# Patient Record
Sex: FEMALE | Race: WHITE | Employment: FULL TIME | URBAN - METROPOLITAN AREA
[De-identification: names, ages, dates, MRNs, and addresses within clinical notes are randomized per-mention and may not be internally consistent; named-entity substitution may affect disease eponyms.]

---

## 2017-01-23 ENCOUNTER — TELEPHONE (OUTPATIENT)
Dept: FAMILY MEDICINE CLINIC | Facility: CLINIC | Age: 52
End: 2017-01-23

## 2017-02-02 ENCOUNTER — OFFICE VISIT (OUTPATIENT)
Dept: FAMILY MEDICINE CLINIC | Facility: CLINIC | Age: 52
End: 2017-02-02

## 2017-02-02 VITALS
TEMPERATURE: 98 F | RESPIRATION RATE: 16 BRPM | DIASTOLIC BLOOD PRESSURE: 80 MMHG | BODY MASS INDEX: 37 KG/M2 | SYSTOLIC BLOOD PRESSURE: 132 MMHG | HEART RATE: 88 BPM | WEIGHT: 244.63 LBS

## 2017-02-02 DIAGNOSIS — E11.9 TYPE 2 DIABETES MELLITUS WITHOUT COMPLICATION, WITHOUT LONG-TERM CURRENT USE OF INSULIN (HCC): Primary | ICD-10-CM

## 2017-02-02 DIAGNOSIS — J06.9 VIRAL URI: ICD-10-CM

## 2017-02-02 PROCEDURE — 99213 OFFICE O/P EST LOW 20 MIN: CPT | Performed by: INTERNAL MEDICINE

## 2017-02-02 RX ORDER — AZITHROMYCIN 250 MG/1
TABLET, FILM COATED ORAL
Refills: 1 | COMMUNITY
Start: 2017-01-16 | End: 2017-02-02 | Stop reason: ALTCHOICE

## 2017-02-02 RX ORDER — TRAZODONE HYDROCHLORIDE 50 MG/1
TABLET ORAL
Refills: 2 | COMMUNITY
Start: 2017-01-24 | End: 2020-12-22

## 2017-02-02 NOTE — PROGRESS NOTES
[facility] Internal Medicine Office Note  Chief Complaint:   Patient presents with: Follow - Up: pt here for DM / est care in December 2016; told to f/u February. Pt c/o some sinus congestion, sore throat x 2-3 weeks.        HPI:   This is a 5 Status: Never Used                        Alcohol Use: Yes           0.0 oz/week       0 Standard drinks or equivalent per week       Comment: rare    Allergies:    Bactrim                 Hives    Comment:DS TABS  Cephalosporins          Diarrhea, Nausea Cardiovascular: Negative for chest pain. Neurological: Negative. Psychiatric/Behavioral: Negative.          EXAM:   /80 mmHg  Pulse 88  Temp(Src) 98.3 °F (36.8 °C) (Temporal)  Resp 16  Wt 244 lb 9.6 oz Estimated body mass index is 37.2 kg/(m^2) guaifenasin prn    She has weight loss clinic appointment this month.      Orders Placed This Encounter  Hemoglobin A1C [E]    Meds & Refills for this Visit:    No prescriptions requested or ordered in this encounter       Imaging & Consults:  None    Annua

## 2017-02-02 NOTE — PATIENT INSTRUCTIONS
Thank you for choosing Philip Kirkland MD at [place] [address]  To Do: Rashi Aragon  1. Get hemoglobin A1c test after February 18, 2017  2.  Follow up in 4 months     • Please signup for MY CHART, which is electronic access to your record if you have Central Scheduling at [phone]  Please allow our office 5 business days to contact you regarding any testing results.     Refill policies:   Allow 3 business days for refills; controlled substances may take longer and must be picked up from the office i

## 2017-02-08 ENCOUNTER — OFFICE VISIT (OUTPATIENT)
Dept: INTERNAL MEDICINE CLINIC | Facility: CLINIC | Age: 52
End: 2017-02-08

## 2017-02-08 VITALS
WEIGHT: 243.19 LBS | SYSTOLIC BLOOD PRESSURE: 130 MMHG | RESPIRATION RATE: 16 BRPM | HEIGHT: 68 IN | BODY MASS INDEX: 36.86 KG/M2 | DIASTOLIC BLOOD PRESSURE: 78 MMHG | HEART RATE: 80 BPM

## 2017-02-08 DIAGNOSIS — Z87.59 HISTORY OF MISCARRIAGE: ICD-10-CM

## 2017-02-08 DIAGNOSIS — E55.9 VITAMIN D DEFICIENCY: ICD-10-CM

## 2017-02-08 DIAGNOSIS — G47.00 INSOMNIA, UNSPECIFIED: ICD-10-CM

## 2017-02-08 DIAGNOSIS — Z51.81 THERAPEUTIC DRUG MONITORING: Primary | ICD-10-CM

## 2017-02-08 DIAGNOSIS — E89.0 POSTOPERATIVE HYPOTHYROIDISM: ICD-10-CM

## 2017-02-08 DIAGNOSIS — F41.9 ANXIETY: ICD-10-CM

## 2017-02-08 DIAGNOSIS — E11.9 TYPE 2 DIABETES MELLITUS WITHOUT COMPLICATION, WITHOUT LONG-TERM CURRENT USE OF INSULIN (HCC): ICD-10-CM

## 2017-02-08 DIAGNOSIS — R94.31 NONSPECIFIC ABNORMAL ELECTROCARDIOGRAM (ECG) (EKG): ICD-10-CM

## 2017-02-08 DIAGNOSIS — E66.01 SEVERE OBESITY (BMI 35.0-39.9): ICD-10-CM

## 2017-02-08 DIAGNOSIS — R06.83 SNORING: ICD-10-CM

## 2017-02-08 DIAGNOSIS — R53.83 FATIGUE, UNSPECIFIED TYPE: ICD-10-CM

## 2017-02-08 PROCEDURE — 99214 OFFICE O/P EST MOD 30 MIN: CPT | Performed by: NURSE PRACTITIONER

## 2017-02-08 PROCEDURE — 93000 ELECTROCARDIOGRAM COMPLETE: CPT | Performed by: NURSE PRACTITIONER

## 2017-02-08 RX ORDER — DIETHYLPROPION HYDROCHLORIDE 75 MG/1
1 TABLET ORAL
Qty: 30 TABLET | Refills: 0 | Status: SHIPPED | OUTPATIENT
Start: 2017-02-08 | End: 2017-03-08

## 2017-02-08 NOTE — PROGRESS NOTES
Nevaeh Aragon is a 49year old female new to our office today. Referred by PCP. S:  Patient presents today with c/o weight gain after marriage and increased with infertility treatments.   Patient sees excess weight as having a severe effect on healt body fat: F >32% 40.7%  GENERAL: well developed, well nourished, in no apparent distress, obese  SKIN: warm, pink, dry without rashes to exposed area  EYES: conjunctiva pink, sclera non icteric, PERRLA  HEENT: atraumatic, normocephalic, O/p: Mallampati sco Cardiac Stress Echo; Future    4. Postoperative hypothyroidism  - investigate type of CA, may not be candidate for Saxenda if needed to be used    5.  Insomnia, unspecified  - CALM for meditation  - Home Sleep Apnea Test-Adult patients only-Sleep consultati following dietary changes this first month:    1. Drink water with meals and throughout the day, cut down on soda and/or juice if consumed.   2.  Eat breakfast daily and focus on having protein with each meal, examples include: greek yogurt, cottage cheese

## 2017-02-08 NOTE — PATIENT INSTRUCTIONS
Welcome to the [facility]. ..your Lifestyle Renovation begins now! Thank you for placing your trust in our health care team, I look forward to working with you along this journey to better health!     Next steps:     1.  Schedule a personal n stress, but meditation using the CALM Laine or another comparable alternative can be done in your home or place of work with little time commitment. Don't forget to schedule your 1 month follow-up visit!

## 2017-02-18 ENCOUNTER — APPOINTMENT (OUTPATIENT)
Dept: LAB | Age: 52
End: 2017-02-18
Attending: NURSE PRACTITIONER
Payer: COMMERCIAL

## 2017-02-18 DIAGNOSIS — F41.9 ANXIETY: ICD-10-CM

## 2017-02-18 DIAGNOSIS — Z87.59 HISTORY OF MISCARRIAGE: ICD-10-CM

## 2017-02-18 DIAGNOSIS — R53.83 FATIGUE, UNSPECIFIED TYPE: ICD-10-CM

## 2017-02-18 DIAGNOSIS — E66.01 SEVERE OBESITY (BMI 35.0-39.9): ICD-10-CM

## 2017-02-18 DIAGNOSIS — E55.9 VITAMIN D DEFICIENCY: ICD-10-CM

## 2017-02-18 DIAGNOSIS — E11.9 TYPE 2 DIABETES MELLITUS WITHOUT COMPLICATION, WITHOUT LONG-TERM CURRENT USE OF INSULIN (HCC): ICD-10-CM

## 2017-02-18 LAB
25-HYDROXYVITAMIN D (TOTAL): 39.1 NG/ML (ref 30–100)
BUN BLD-MCNC: 12 MG/DL (ref 8–20)
CALCIUM BLD-MCNC: 8.8 MG/DL (ref 8.3–10.3)
CHLORIDE: 104 MMOL/L (ref 101–111)
CO2: 28 MMOL/L (ref 22–32)
CREAT BLD-MCNC: 0.68 MG/DL (ref 0.55–1.02)
EST. AVERAGE GLUCOSE BLD GHB EST-MCNC: 131 MG/DL (ref 68–126)
GLUCOSE BLD-MCNC: 111 MG/DL (ref 70–99)
HAV AB SERPL IA-ACNC: 1207 PG/ML (ref 193–986)
HBA1C MFR BLD HPLC: 6.2 % (ref ?–5.7)
POTASSIUM SERPL-SCNC: 3.6 MMOL/L (ref 3.6–5.1)
SODIUM SERPL-SCNC: 141 MMOL/L (ref 136–144)

## 2017-02-18 PROCEDURE — 82607 VITAMIN B-12: CPT

## 2017-02-18 PROCEDURE — 80048 BASIC METABOLIC PNL TOTAL CA: CPT

## 2017-02-18 PROCEDURE — 82397 CHEMILUMINESCENT ASSAY: CPT

## 2017-02-18 PROCEDURE — 36415 COLL VENOUS BLD VENIPUNCTURE: CPT

## 2017-02-18 PROCEDURE — 81291 MTHFR GENE: CPT

## 2017-02-18 PROCEDURE — 83036 HEMOGLOBIN GLYCOSYLATED A1C: CPT

## 2017-02-18 PROCEDURE — 82306 VITAMIN D 25 HYDROXY: CPT

## 2017-02-20 LAB — LEPTIN: 26.7 NG/ML

## 2017-02-26 ENCOUNTER — OFFICE VISIT (OUTPATIENT)
Dept: SLEEP CENTER | Facility: HOSPITAL | Age: 52
End: 2017-02-26
Attending: NURSE PRACTITIONER
Payer: COMMERCIAL

## 2017-02-26 PROCEDURE — 95806 SLEEP STUDY UNATT&RESP EFFT: CPT

## 2017-02-28 ENCOUNTER — HOSPITAL ENCOUNTER (OUTPATIENT)
Dept: CV DIAGNOSTICS | Age: 52
Discharge: HOME | End: 2017-02-28
Attending: NURSE PRACTITIONER
Payer: COMMERCIAL

## 2017-02-28 ENCOUNTER — APPOINTMENT (OUTPATIENT)
Dept: HEMATOLOGY/ONCOLOGY | Age: 52
End: 2017-02-28
Attending: NURSE PRACTITIONER
Payer: COMMERCIAL

## 2017-02-28 DIAGNOSIS — R53.83 FATIGUE, UNSPECIFIED TYPE: ICD-10-CM

## 2017-02-28 DIAGNOSIS — E66.01 SEVERE OBESITY (BMI 35.0-39.9): ICD-10-CM

## 2017-02-28 DIAGNOSIS — E11.9 TYPE 2 DIABETES MELLITUS WITHOUT COMPLICATION, WITHOUT LONG-TERM CURRENT USE OF INSULIN (HCC): ICD-10-CM

## 2017-02-28 DIAGNOSIS — R94.31 NONSPECIFIC ABNORMAL ELECTROCARDIOGRAM (ECG) (EKG): ICD-10-CM

## 2017-02-28 PROCEDURE — 93018 CV STRESS TEST I&R ONLY: CPT | Performed by: INTERNAL MEDICINE

## 2017-02-28 PROCEDURE — 93017 CV STRESS TEST TRACING ONLY: CPT

## 2017-02-28 PROCEDURE — 93350 STRESS TTE ONLY: CPT

## 2017-02-28 PROCEDURE — 93350 STRESS TTE ONLY: CPT | Performed by: INTERNAL MEDICINE

## 2017-03-03 NOTE — PROCEDURES
[address] [address],[facility] [address]       Accredited by the [facility] ([facility])    PATIENT'S NAME:        Tristin Barger  ATTENDING PHYSICIAN:   Papo Woody M.D.   REFERRING PHYSICIAN:   [address] [place] [address]

## 2017-03-08 ENCOUNTER — OFFICE VISIT (OUTPATIENT)
Dept: INTERNAL MEDICINE CLINIC | Facility: CLINIC | Age: 52
End: 2017-03-08

## 2017-03-08 VITALS
RESPIRATION RATE: 16 BRPM | HEIGHT: 68 IN | WEIGHT: 237 LBS | BODY MASS INDEX: 35.92 KG/M2 | SYSTOLIC BLOOD PRESSURE: 120 MMHG | DIASTOLIC BLOOD PRESSURE: 80 MMHG | HEART RATE: 80 BPM

## 2017-03-08 DIAGNOSIS — G43.809 OTHER MIGRAINE WITHOUT STATUS MIGRAINOSUS, NOT INTRACTABLE: ICD-10-CM

## 2017-03-08 DIAGNOSIS — E66.01 SEVERE OBESITY (BMI 35.0-39.9): ICD-10-CM

## 2017-03-08 DIAGNOSIS — Z51.81 THERAPEUTIC DRUG MONITORING: Primary | ICD-10-CM

## 2017-03-08 DIAGNOSIS — E11.9 TYPE 2 DIABETES MELLITUS WITHOUT COMPLICATION, WITHOUT LONG-TERM CURRENT USE OF INSULIN (HCC): ICD-10-CM

## 2017-03-08 PROCEDURE — 99214 OFFICE O/P EST MOD 30 MIN: CPT | Performed by: NURSE PRACTITIONER

## 2017-03-08 RX ORDER — DIETHYLPROPION HYDROCHLORIDE 75 MG/1
1 TABLET ORAL
Qty: 30 TABLET | Refills: 0 | Status: SHIPPED | OUTPATIENT
Start: 2017-03-08 | End: 2017-04-12

## 2017-03-08 RX ORDER — TOPIRAMATE 25 MG/1
25 TABLET ORAL 2 TIMES DAILY
Qty: 60 TABLET | Refills: 0 | Status: SHIPPED | OUTPATIENT
Start: 2017-03-08 | End: 2017-04-12

## 2017-03-08 RX ORDER — BUSPIRONE HYDROCHLORIDE 30 MG/1
30 TABLET ORAL 2 TIMES DAILY
COMMUNITY

## 2017-03-08 RX ORDER — METFORMIN HYDROCHLORIDE 750 MG/1
750 TABLET, EXTENDED RELEASE ORAL DAILY
Qty: 30 TABLET | Refills: 1 | Status: SHIPPED | OUTPATIENT
Start: 2017-03-08 | End: 2017-04-12

## 2017-03-08 NOTE — PATIENT INSTRUCTIONS
Congratulations on your 6# weight loss! Continue making lifestyle changes that focus on good nutrition, regular exercise and stress management. Today we reviewed your labs with findings of: leptin elevation, excess Vitamin B12.  Sleep study was normal. Car genuinely hungry, you may experience one or several of the symptoms listed below:  Stomach pangs or growling   Emptiness in the stomach   Irritability   Headache   Low energy/fatigue   Difficulty concentrating  How Does the Scale Work?   Before you eat, olinda Diabetes Association @ www.diabetes. org.   Last Edited: March 19, 2014, reviewed December 17, 2013

## 2017-03-08 NOTE — PROGRESS NOTES
Tory Pineda Mahesh is a 49year old female presents today for 1 month follow-up on medical weight loss program for the treatment of overweight, obesity, or morbid obesity with associated controlled type 2 diabetes.     S:  Current weight Wt Readings from [employer] diagnosis)  Severe obesity (bmi 35.0-39.9) (hcc)  Type 2 diabetes mellitus without complication, without long-term current use of insulin (hcc)  Other migraine without status migrainosus, not intractable    No orders of the defined types were placed in [facility] prescribed. Agreed upon goal/s before next office visit include: Recommend book called Ultrametabolism by Dr. Preet Ventura. Leptin  Leptin is a hormone that helps regulate your body weight by controlling your appetite and energy level.  This test is use overeating. When you first start to feel any of the symptoms listed above, you should probably start to think about eating. We often let the sight of food tempt us when we are above a level 6 on the scale.  Before you indulge, take a step back and think ab

## 2017-04-07 ENCOUNTER — HOSPITAL ENCOUNTER (OUTPATIENT)
Dept: ULTRASOUND IMAGING | Age: 52
Discharge: HOME | End: 2017-04-07
Attending: INTERNAL MEDICINE
Payer: COMMERCIAL

## 2017-04-07 ENCOUNTER — OFFICE VISIT (OUTPATIENT)
Dept: INTERNAL MEDICINE CLINIC | Facility: CLINIC | Age: 52
End: 2017-04-07

## 2017-04-07 ENCOUNTER — OFFICE VISIT (OUTPATIENT)
Dept: FAMILY MEDICINE CLINIC | Facility: CLINIC | Age: 52
End: 2017-04-07

## 2017-04-07 VITALS
WEIGHT: 232 LBS | DIASTOLIC BLOOD PRESSURE: 80 MMHG | BODY MASS INDEX: 38.19 KG/M2 | TEMPERATURE: 99 F | HEIGHT: 65.5 IN | RESPIRATION RATE: 16 BRPM | HEART RATE: 84 BPM | SYSTOLIC BLOOD PRESSURE: 120 MMHG

## 2017-04-07 VITALS — WEIGHT: 231 LBS | BODY MASS INDEX: 35 KG/M2

## 2017-04-07 DIAGNOSIS — R60.9 PAROTID SWELLING: ICD-10-CM

## 2017-04-07 DIAGNOSIS — R60.9 PAROTID SWELLING: Primary | ICD-10-CM

## 2017-04-07 DIAGNOSIS — E66.9 OBESITY, CLASS II, BMI 35-39.9: ICD-10-CM

## 2017-04-07 PROCEDURE — 76536 US EXAM OF HEAD AND NECK: CPT

## 2017-04-07 PROCEDURE — 99213 OFFICE O/P EST LOW 20 MIN: CPT | Performed by: INTERNAL MEDICINE

## 2017-04-07 PROCEDURE — 97802 MEDICAL NUTRITION INDIV IN: CPT | Performed by: DIETITIAN, REGISTERED

## 2017-04-07 NOTE — PROGRESS NOTES
INITIAL OUTPATIENT NUTRITION CONSULTATION    Nutrition Assessment    Medical Diagnosis: Obesity    Client Hx: 49year old female,  with 24 yo son,     Problem List as of 4/7/2017        Cardiovascular    Other and unspecifie Gluc Meter Disp-Strips Does not apply Device Patient to test daily and PRN for uncontrolled blood sugars. DX-250.00 Disp: 100 Device Rfl: 3   Cyanocobalamin (VITAMIN B-12 CR OR) Take  by mouth.  Disp:  Rfl:    Cholecalciferol (VITAMIN D3) 5000 UNITS Oral Ca from Last 1 Encounters:  03/08/17 : 68\"      Weight:   Wt Readings from Last 2 Encounters:  04/07/17 : 231 lb  03/08/17 : 237 lb      Body mass index is 35.13 kg/(m^2). Weight change: Decrease of 6 lbs in the past month    Diet/Weight History:  Max weig insulin sensitivity. Goals:   · Continue efforts to reduce desserts  · Continue tracking food intake  · Increase PA as able    Monitoring/Evaluation:  Patient scheduled to return to Weight Loss Clinic.   Follow up with RD as needed           Chavez Valle

## 2017-04-07 NOTE — PROGRESS NOTES
[facility] Internal Medicine Office Note  Chief Complaint:   Patient presents with:  Mass: close to left ear. feels hard, was bigger. has been putting warm compresses. She got checked out by school nurse. but has no other symptoms. Noticed it Tues Smoking Status: Never Smoker                      Smokeless Status: Never Used                        Alcohol Use: Yes           0.0 oz/week       0 Standard drinks or equivalent per week       Comment: rare    Allergies:    Bactrim                 Hives B Complex Oral Tab Take 1 Tab by mouth daily. Disp:  Rfl:          REVIEW OF SYSTEMS:   Review of Systems   Constitutional: Negative for fever. HENT: Negative for ear pain, rhinorrhea and sore throat.     Respiratory: Negative for cough and shortness of due on 06/03/2016  Colonoscopy,10 Years due on 06/03/2016  Influenza Vaccine(1) due on 09/01/2016  LDL Control due on 12/21/2016  Mammogram,1 Yr due on 03/28/2017  Patient/Caregiver Education: Patient/Caregiver Education: There are no barriers to learning.

## 2017-04-12 ENCOUNTER — OFFICE VISIT (OUTPATIENT)
Dept: INTERNAL MEDICINE CLINIC | Facility: CLINIC | Age: 52
End: 2017-04-12

## 2017-04-12 VITALS
HEIGHT: 68 IN | RESPIRATION RATE: 16 BRPM | DIASTOLIC BLOOD PRESSURE: 70 MMHG | HEART RATE: 90 BPM | BODY MASS INDEX: 35.01 KG/M2 | WEIGHT: 231 LBS | SYSTOLIC BLOOD PRESSURE: 110 MMHG

## 2017-04-12 DIAGNOSIS — Z51.81 THERAPEUTIC DRUG MONITORING: Primary | ICD-10-CM

## 2017-04-12 DIAGNOSIS — E66.01 SEVERE OBESITY (BMI 35.0-39.9): ICD-10-CM

## 2017-04-12 DIAGNOSIS — G43.809 OTHER MIGRAINE WITHOUT STATUS MIGRAINOSUS, NOT INTRACTABLE: ICD-10-CM

## 2017-04-12 DIAGNOSIS — E11.9 TYPE 2 DIABETES MELLITUS WITHOUT COMPLICATION, WITHOUT LONG-TERM CURRENT USE OF INSULIN (HCC): ICD-10-CM

## 2017-04-12 PROCEDURE — 99213 OFFICE O/P EST LOW 20 MIN: CPT | Performed by: NURSE PRACTITIONER

## 2017-04-12 RX ORDER — METFORMIN HYDROCHLORIDE 750 MG/1
1500 TABLET, EXTENDED RELEASE ORAL
Qty: 60 TABLET | Refills: 1 | Status: SHIPPED | OUTPATIENT
Start: 2017-04-12 | End: 2017-05-10

## 2017-04-12 RX ORDER — TOPIRAMATE 25 MG/1
25 TABLET ORAL 2 TIMES DAILY
Qty: 60 TABLET | Refills: 0 | Status: CANCELLED | OUTPATIENT
Start: 2017-04-12

## 2017-04-12 RX ORDER — DIETHYLPROPION HYDROCHLORIDE 75 MG/1
1 TABLET ORAL
Qty: 30 TABLET | Refills: 0 | Status: SHIPPED | OUTPATIENT
Start: 2017-04-12 | End: 2017-05-10

## 2017-04-12 NOTE — PROGRESS NOTES
Carmina Aragon is a 49year old female presents today for 2 month follow-up on medical weight loss program for the treatment of overweight, obesity, or morbid obesity with associated controlled type 2 diabetes.     S:  Current weight Wt Readings from [employer] tenderness  NEURO/MS: motor and sensory grossly intact  PSYCH: pleasant, cooperative, normal mood and affect    ASSESSMENT AND PLAN:  Therapeutic drug monitoring  (primary encounter diagnosis)  Severe obesity (bmi 35.0-39.9) (hcc)  Type 2 diabetes mellitus 5/12/2017) for weight mangement. Patient verbalizes understanding.

## 2017-04-12 NOTE — PATIENT INSTRUCTIONS
Congratulations on your 6# weight loss! Continue making lifestyle changes that focus on good nutrition, regular exercise and stress management. Medication Plan: Continue diethylpropion at current dose. Stop Topamax.  Increase Metformin XR to 1500 mg leti

## 2017-05-10 ENCOUNTER — OFFICE VISIT (OUTPATIENT)
Dept: INTERNAL MEDICINE CLINIC | Facility: CLINIC | Age: 52
End: 2017-05-10

## 2017-05-10 VITALS
SYSTOLIC BLOOD PRESSURE: 124 MMHG | WEIGHT: 233 LBS | HEART RATE: 82 BPM | DIASTOLIC BLOOD PRESSURE: 84 MMHG | BODY MASS INDEX: 35.31 KG/M2 | HEIGHT: 68 IN | RESPIRATION RATE: 16 BRPM

## 2017-05-10 DIAGNOSIS — E11.9 TYPE 2 DIABETES MELLITUS WITHOUT COMPLICATION, WITHOUT LONG-TERM CURRENT USE OF INSULIN (HCC): ICD-10-CM

## 2017-05-10 DIAGNOSIS — E66.01 SEVERE OBESITY (BMI 35.0-39.9): ICD-10-CM

## 2017-05-10 DIAGNOSIS — Z51.81 THERAPEUTIC DRUG MONITORING: Primary | ICD-10-CM

## 2017-05-10 PROCEDURE — 99213 OFFICE O/P EST LOW 20 MIN: CPT | Performed by: NURSE PRACTITIONER

## 2017-05-10 RX ORDER — BUPROPION HYDROCHLORIDE 150 MG/1
150 TABLET ORAL DAILY
Qty: 30 TABLET | Refills: 0 | Status: SHIPPED | OUTPATIENT
Start: 2017-05-10 | End: 2017-06-28

## 2017-05-10 RX ORDER — METFORMIN HYDROCHLORIDE 750 MG/1
1500 TABLET, EXTENDED RELEASE ORAL
Qty: 60 TABLET | Refills: 1 | Status: SHIPPED | OUTPATIENT
Start: 2017-05-10 | End: 2017-06-28

## 2017-05-10 RX ORDER — DIETHYLPROPION HYDROCHLORIDE 75 MG/1
1 TABLET ORAL
Qty: 30 TABLET | Refills: 0 | Status: SHIPPED | OUTPATIENT
Start: 2017-05-10 | End: 2017-06-09

## 2017-05-10 NOTE — PROGRESS NOTES
Eunice Aragon is a 49year old female presents today for 3 month follow-up on medical weight loss program for the treatment of overweight, obesity, or morbid obesity with associated controlled type 2 diabetes.     S:  Current weight Wt Readings from [employer] sensory grossly intact  PSYCH: pleasant, cooperative, normal mood and affect    ASSESSMENT AND PLAN:  Therapeutic drug monitoring  (primary encounter diagnosis)  Severe obesity (bmi 35.0-39.9) (hcc)  Type 2 diabetes mellitus without complication, without l Training/Fitness  · [facility] [employer], ask for Marlee Bowen [phone] [email or web address] [email or web address] org  · No Excuses with Helen Hunter [phone] www.[employer]. PAYMILL  · [employer]    Online  · Fitness  on MissingLINK in 10 DVD will change unless you make a commit to changing your behavior. 2. Practice awareness. To be more conscious of what’s happening, jot down when and what you eat and how you feel before and afterwards. 3. Manage your stress.  Healthy emotional distress daniele stop emotional eating and overeating is a life-changing experience. Just make sure to stay on track and enjoy the journey. Posted By Marlen Brooks On December 27, 2015 @ 11:33 am In Healthy Living. Taken from www.[employer]        Get In Touch are:  Drink a glass of cold water or another zero-calorie beverage   Take a walk to change the scenery   Do another form of exercise (sit-ups, running, swimming, tennis, etc.)   Call/text a friend or family member   Play a game with someone else  **   Emmy

## 2017-05-10 NOTE — PATIENT INSTRUCTIONS
Continue making lifestyle changes that focus on good nutrition, regular exercise and stress management. Medication Plan: Continue diethylpropion and metformin XR at current dose.  Add Wellbutrin  mg daily in AM.  Agreed upon goal/s before next offi overeating? Most of us learn emotional eating at a very young age. We get into the habit of using food to sooth stressful feelings, alleviate boredom, reward and comfort ourselves, boost our sprits and celebrate with others.   But even though almost everyo your cat or dog, listen to music, enjoy a warm bath, read a good book, watch a movie, work in the garden or talk to a friend. 11. Practice mindfulness.  Mindful eating means paying attention to the act of eating and observing your thoughts and feelings in concentrate — may lead to overeating. When you first start to feel any of the symptoms listed above, you should probably start to think about eating. We often let the sight of food tempt us when we are above a level 6 on the scale.  Before you indulge, olinda

## 2017-05-12 ENCOUNTER — TELEPHONE (OUTPATIENT)
Dept: INTERNAL MEDICINE CLINIC | Facility: CLINIC | Age: 52
End: 2017-05-12

## 2017-05-12 NOTE — TELEPHONE ENCOUNTER
[facility] patient: She is no longer taking Tamoxifen. Fill Wellbutrin XL as prescribed please.  Thank you

## 2017-05-12 NOTE — TELEPHONE ENCOUNTER
BuPROPion HCl ER, XL, 150 MG Oral Tablet 24 Hr 30 tablet 0 5/10/2017       Sig :  Take 1 tablet (150 mg total) by mouth daily.       Route:   Oral       Order #:   [id number]       And this medication-    Tamoxifen     Pharmacy states that there is a chance

## 2017-05-12 NOTE — TELEPHONE ENCOUNTER
Bupropion will decrease the effectiveness of tamoxifen by altering drug metabolism. This combination reduces breakdown of tamoxifen to its active form. Do you still want patient to take the wellbutrin?

## 2017-06-06 ENCOUNTER — APPOINTMENT (OUTPATIENT)
Dept: LAB | Age: 52
End: 2017-06-06
Attending: OTOLARYNGOLOGY
Payer: COMMERCIAL

## 2017-06-06 DIAGNOSIS — E07.9 THYROID DYSFUNCTION: ICD-10-CM

## 2017-06-06 PROCEDURE — 36415 COLL VENOUS BLD VENIPUNCTURE: CPT

## 2017-06-06 PROCEDURE — 84439 ASSAY OF FREE THYROXINE: CPT

## 2017-06-06 PROCEDURE — 84443 ASSAY THYROID STIM HORMONE: CPT

## 2017-06-26 ENCOUNTER — TELEPHONE (OUTPATIENT)
Dept: HEMATOLOGY/ONCOLOGY | Facility: HOSPITAL | Age: 52
End: 2017-06-26

## 2017-06-26 DIAGNOSIS — C50.919 MALIGNANT NEOPLASM OF FEMALE BREAST (HCC): Primary | ICD-10-CM

## 2017-06-28 ENCOUNTER — OFFICE VISIT (OUTPATIENT)
Dept: INTERNAL MEDICINE CLINIC | Facility: CLINIC | Age: 52
End: 2017-06-28

## 2017-06-28 VITALS
BODY MASS INDEX: 35.77 KG/M2 | SYSTOLIC BLOOD PRESSURE: 120 MMHG | HEART RATE: 72 BPM | WEIGHT: 236 LBS | RESPIRATION RATE: 16 BRPM | DIASTOLIC BLOOD PRESSURE: 70 MMHG | HEIGHT: 68 IN

## 2017-06-28 DIAGNOSIS — E66.01 SEVERE OBESITY (BMI 35.0-39.9): ICD-10-CM

## 2017-06-28 DIAGNOSIS — E11.9 TYPE 2 DIABETES MELLITUS WITHOUT COMPLICATION, WITHOUT LONG-TERM CURRENT USE OF INSULIN (HCC): ICD-10-CM

## 2017-06-28 DIAGNOSIS — Z51.81 THERAPEUTIC DRUG MONITORING: Primary | ICD-10-CM

## 2017-06-28 PROCEDURE — 99213 OFFICE O/P EST LOW 20 MIN: CPT | Performed by: NURSE PRACTITIONER

## 2017-06-28 RX ORDER — BUPROPION HYDROCHLORIDE 150 MG/1
150 TABLET ORAL DAILY
Qty: 30 TABLET | Refills: 0 | Status: SHIPPED | OUTPATIENT
Start: 2017-06-28 | End: 2017-07-19

## 2017-06-28 RX ORDER — DIETHYLPROPION HYDROCHLORIDE 75 MG/1
1 TABLET ORAL
Qty: 30 TABLET | Refills: 0 | Status: SHIPPED | OUTPATIENT
Start: 2017-06-28 | End: 2017-07-19

## 2017-06-28 RX ORDER — METFORMIN HYDROCHLORIDE 750 MG/1
1500 TABLET, EXTENDED RELEASE ORAL
Qty: 60 TABLET | Refills: 1 | Status: SHIPPED | OUTPATIENT
Start: 2017-06-28 | End: 2017-07-19

## 2017-06-28 NOTE — PATIENT INSTRUCTIONS
Continue making lifestyle changes that focus on good nutrition, regular exercise and stress management. Medication Plan: Continue Diethylpropion and Metformin.  Start Wellbutrin XL daily in AM.  Agreed upon goal/s before next office visit include: Resume to an emotion or because you are experiencing physical hunger? Think of alternatives to eating for when these temptations arise.  Some ideas are:  Drink a glass of cold water or another zero-calorie beverage   Take a walk to change the scenery   Do another within several hours after eating a meal. If you don’t eat, you’ll feel your energy level drop. You may also notice that you’re less focused and alert. Try packing snacks to bring with you to work, school, or wherever your busy schedule takes you.  Anai Bermudez

## 2017-06-28 NOTE — PROGRESS NOTES
Jamir Aragon is a 47year old female presents today for 4 month follow-up on medical weight loss program for the treatment of overweight, obesity, or morbid obesity with associated controlled type 2 diabetes.     S:  Current weight Wt Readings from [employer] complication, without long-term current use of insulin (hcc)    No orders of the defined types were placed in this encounter.       Meds & Refills for this Visit:    Signed Prescriptions Disp Refills    MetFORMIN HCl  MG Oral Tablet 24 Hr 60 tablet 1 mental processes that make us think we are hungry even when we’re not. **The Hunger Rating Scale can help you decide if you are experiencing real hunger.   Remember that physical hunger builds gradually over time (usually over several hours after a meal), 5 = Comfortable, neither hungry nor full   4 = Beginning signs and symptoms of hunger    3 = Hungry with several hunger symptoms, ready to eat    2 = Very hungry, unable to concentrate    Hungry - 1    1 = Starving, dizzy, irritable     ________________ your snacks wisely  High-fat choices to avoid:  · Hong Leonid and donuts  · Snack cakes, cupcakes  · Chips and crackers  · Chocolate bars  · Cream-filled cookies  Date Last Reviewed: 6/8/2015  © 2379-7911 The [address] [place] [address] [place]

## 2017-06-30 ENCOUNTER — HOSPITAL ENCOUNTER (OUTPATIENT)
Dept: MAMMOGRAPHY | Age: 52
Discharge: HOME | End: 2017-06-30
Attending: INTERNAL MEDICINE
Payer: COMMERCIAL

## 2017-06-30 ENCOUNTER — APPOINTMENT (OUTPATIENT)
Dept: HEMATOLOGY/ONCOLOGY | Facility: HOSPITAL | Age: 52
End: 2017-06-30
Attending: INTERNAL MEDICINE
Payer: COMMERCIAL

## 2017-06-30 DIAGNOSIS — C50.919 MALIGNANT NEOPLASM OF FEMALE BREAST (HCC): ICD-10-CM

## 2017-06-30 PROCEDURE — 77065 DX MAMMO INCL CAD UNI: CPT | Performed by: INTERNAL MEDICINE

## 2017-07-06 ENCOUNTER — OFFICE VISIT (OUTPATIENT)
Dept: HEMATOLOGY/ONCOLOGY | Age: 52
End: 2017-07-06
Attending: INTERNAL MEDICINE
Payer: COMMERCIAL

## 2017-07-06 VITALS
DIASTOLIC BLOOD PRESSURE: 89 MMHG | HEART RATE: 109 BPM | TEMPERATURE: 99 F | OXYGEN SATURATION: 96 % | RESPIRATION RATE: 18 BRPM | SYSTOLIC BLOOD PRESSURE: 127 MMHG

## 2017-07-06 DIAGNOSIS — Z17.0 MALIGNANT NEOPLASM OF OVERLAPPING SITES OF LEFT BREAST IN FEMALE, ESTROGEN RECEPTOR POSITIVE (HCC): Primary | ICD-10-CM

## 2017-07-06 DIAGNOSIS — C50.812 MALIGNANT NEOPLASM OF OVERLAPPING SITES OF LEFT BREAST IN FEMALE, ESTROGEN RECEPTOR POSITIVE (HCC): Primary | ICD-10-CM

## 2017-07-06 PROCEDURE — 99213 OFFICE O/P EST LOW 20 MIN: CPT | Performed by: INTERNAL MEDICINE

## 2017-07-06 NOTE — PROGRESS NOTES
Cancer Center Progress Note  Patient Name: Cecilia Saez   YOB: 1965   Medical Record Number: [id number]     Attending Physician: Peter Saunders M.D. Date of Visit: 7/6/2017    Chief Complaint:  Patient presents with:   Follow - arthralgia and grade 3 neuropathy with the first dose of Taxol. The neuropathy has recovered somewhat, but pt was unable to continue on with Taxol and was transitioned to Tamoxifen with a plan for 5 years of hormonal therapy.      She discontinued hormonal 0.0 oz/week     Comment: rare    Drug use: No    Sexual activity: Not on file     Other Topics Concern    Caffeine Concern Yes    Comment: 1-2 daily    Exercise Yes    Comment: at least 4-5x per week    Seat Belt Yes     Social History Narrative   None on vomiting  Ciprofloxacin           Diarrhea, Nausea only    Comment:HCI TABS  Clindamycin             Nausea and vomiting  Dander                  Itching, Swelling    Comment:Hand, feet, eyes  Fish-Derived Produc*    Swelling    Comment:Fingers/toes     Re motor deficits, normal cerebellar function, normal gait, cranial nerves intact. Psychiatric Normal - A&Ox3. Coherent speech. Verbalizes understanding of our discussions today. Breasts   Abnormal - Reconstructed L breast w/o masses.   R breast with surg family. Electronically Signed by: [facility]

## 2017-07-19 ENCOUNTER — OFFICE VISIT (OUTPATIENT)
Dept: INTERNAL MEDICINE CLINIC | Facility: CLINIC | Age: 52
End: 2017-07-19

## 2017-07-19 VITALS
DIASTOLIC BLOOD PRESSURE: 84 MMHG | SYSTOLIC BLOOD PRESSURE: 120 MMHG | WEIGHT: 236 LBS | RESPIRATION RATE: 16 BRPM | BODY MASS INDEX: 35.77 KG/M2 | HEIGHT: 68 IN | HEART RATE: 104 BPM

## 2017-07-19 DIAGNOSIS — E11.9 TYPE 2 DIABETES MELLITUS WITHOUT COMPLICATION, WITHOUT LONG-TERM CURRENT USE OF INSULIN (HCC): ICD-10-CM

## 2017-07-19 DIAGNOSIS — E66.01 SEVERE OBESITY (BMI 35.0-39.9): ICD-10-CM

## 2017-07-19 DIAGNOSIS — Z51.81 THERAPEUTIC DRUG MONITORING: Primary | ICD-10-CM

## 2017-07-19 PROCEDURE — 99213 OFFICE O/P EST LOW 20 MIN: CPT | Performed by: NURSE PRACTITIONER

## 2017-07-19 RX ORDER — METFORMIN HYDROCHLORIDE 750 MG/1
1500 TABLET, EXTENDED RELEASE ORAL
Qty: 60 TABLET | Refills: 1 | Status: SHIPPED | OUTPATIENT
Start: 2017-07-19 | End: 2017-09-13

## 2017-07-19 RX ORDER — BUPROPION HYDROCHLORIDE 300 MG/1
300 TABLET ORAL DAILY
Qty: 30 TABLET | Refills: 1 | Status: SHIPPED | OUTPATIENT
Start: 2017-07-19 | End: 2017-08-09

## 2017-07-19 RX ORDER — DIETHYLPROPION HYDROCHLORIDE 75 MG/1
1 TABLET ORAL
Qty: 30 TABLET | Refills: 0 | Status: SHIPPED | OUTPATIENT
Start: 2017-07-19 | End: 2017-08-09

## 2017-07-19 NOTE — PATIENT INSTRUCTIONS
Continue making lifestyle changes that focus on good nutrition, regular exercise and stress management. Medication Plan: Continue diethylpropion and Metformin XR at current dose.  Increase Wellbutrin XL to 300 mg daily, add Jardiance 10 mg daily in AM (s is different from when you were younger. An increase in visceral (abdominal) fat is linked to an increase in insulin resistance, diabetes, and inflammatory diseases.     Another factor contributing to weight gain in perimenopause may be the increased appeti friend, ordering the lighter portion when available, or put half in the takeout box right away. Swap out dessert for fruit or yogurt. Reduce carbs.  Cut back on carbs in order to reduce the increase in belly fat, which drives metabolic problems   Add fibe

## 2017-08-09 ENCOUNTER — OFFICE VISIT (OUTPATIENT)
Dept: INTERNAL MEDICINE CLINIC | Facility: CLINIC | Age: 52
End: 2017-08-09

## 2017-08-09 VITALS
HEIGHT: 68 IN | BODY MASS INDEX: 35.16 KG/M2 | SYSTOLIC BLOOD PRESSURE: 130 MMHG | WEIGHT: 232 LBS | DIASTOLIC BLOOD PRESSURE: 80 MMHG | HEART RATE: 80 BPM | RESPIRATION RATE: 16 BRPM

## 2017-08-09 DIAGNOSIS — Z51.81 THERAPEUTIC DRUG MONITORING: Primary | ICD-10-CM

## 2017-08-09 DIAGNOSIS — E66.01 SEVERE OBESITY (BMI 35.0-39.9): ICD-10-CM

## 2017-08-09 DIAGNOSIS — E11.9 TYPE 2 DIABETES MELLITUS WITHOUT COMPLICATION, WITHOUT LONG-TERM CURRENT USE OF INSULIN (HCC): ICD-10-CM

## 2017-08-09 PROCEDURE — 99213 OFFICE O/P EST LOW 20 MIN: CPT | Performed by: NURSE PRACTITIONER

## 2017-08-09 RX ORDER — PHENTERMINE HYDROCHLORIDE 15 MG/1
15 CAPSULE ORAL EVERY MORNING
Qty: 30 CAPSULE | Refills: 0 | Status: SHIPPED | OUTPATIENT
Start: 2017-08-09 | End: 2017-09-13

## 2017-08-09 RX ORDER — BUPROPION HYDROCHLORIDE 150 MG/1
150 TABLET ORAL DAILY
Qty: 30 TABLET | Refills: 0 | Status: SHIPPED | OUTPATIENT
Start: 2017-08-09 | End: 2017-09-13 | Stop reason: ALTCHOICE

## 2017-08-09 RX ORDER — DIETHYLPROPION HYDROCHLORIDE 75 MG/1
1 TABLET ORAL
Qty: 30 TABLET | Refills: 0 | Status: CANCELLED | OUTPATIENT
Start: 2017-08-09 | End: 2017-09-08

## 2017-08-09 NOTE — PATIENT INSTRUCTIONS
Congratulations on your 4# weight loss! Continue making lifestyle changes that focus on good nutrition, regular exercise and stress management.   Today we reviewed your labs with findings of:    Medication Plan: Increase Metformin XR to previously prescribe handle the threat by making you feel alert, ready for action and able to withstand an injury.  In the short-term, adrenaline helps you feel less hungry as your blood flows away from the internal organs and to your large muscles to prepare for “fight or flig surge of adrenaline as part of our fight/flight response, we get fidgety and activated. Adrenaline is the reason for the “wired up” feeling we get when we’re stressed.  While we may burn off some extra calories fidgeting or running around cleaning because w jammed commutes, which both increase stress and interfere with willpower because we are hungrier when we get home later.  [address] research study showed, in laboratory mice, that being “stressed” by exposure to the smell of a [employer] with stress, and change your consciousness around eating. You learn to slow down and tune in to your sensory experience of the food, including its sight, texture or smell.  You also learn to tune into your subjective feelings of hunger or fullness, rather t

## 2017-08-09 NOTE — PROGRESS NOTES
Jac Aragon is a 47year old female presents today for 6 month follow-up on medical weight loss program for the treatment of overweight, obesity, or morbid obesity with associated controlled type 2 diabetes.     S:  Current weight Wt Readings from [employer] monitoring  (primary encounter diagnosis)  Severe obesity (bmi 35.0-39.9) (hcc)  Type 2 diabetes mellitus without complication, without long-term current use of insulin (hcc)    No orders of the defined types were placed in this encounter.       Meds & Refi plans and patient counseling. Patient Instructions   Congratulations on your 4# weight loss! Continue making lifestyle changes that focus on good nutrition, regular exercise and stress management.   Today we reviewed your labs with findings of:    Me adrenaline, CRH, and cortisol. Your brain and body prepare to handle the threat by making you feel alert, ready for action and able to withstand an injury.  In the short-term, adrenaline helps you feel less hungry as your blood flows away from the internal physical work dealing with the threat. Anxiety  When we have a surge of adrenaline as part of our fight/flight response, we get fidgety and activated. Adrenaline is the reason for the “wired up” feeling we get when we’re stressed.  While we may burn off so they also work more hours. Working in urban areas may mean long, jammed commutes, which both increase stress and interfere with willpower because we are hungrier when we get home later.  [address] research study showed, in laboratory mice Eating programs train you in meditation, which helps you cope with stress, and change your consciousness around eating. You learn to slow down and tune in to your sensory experience of the food, including its sight, texture or smell.  You also learn to tune

## 2017-09-13 ENCOUNTER — OFFICE VISIT (OUTPATIENT)
Dept: INTERNAL MEDICINE CLINIC | Facility: CLINIC | Age: 52
End: 2017-09-13

## 2017-09-13 VITALS
SYSTOLIC BLOOD PRESSURE: 124 MMHG | HEART RATE: 82 BPM | BODY MASS INDEX: 35.46 KG/M2 | DIASTOLIC BLOOD PRESSURE: 82 MMHG | OXYGEN SATURATION: 99 % | RESPIRATION RATE: 16 BRPM | HEIGHT: 68 IN | WEIGHT: 234 LBS

## 2017-09-13 DIAGNOSIS — E66.01 SEVERE OBESITY (BMI 35.0-39.9): ICD-10-CM

## 2017-09-13 DIAGNOSIS — E11.9 TYPE 2 DIABETES MELLITUS WITHOUT COMPLICATION, WITHOUT LONG-TERM CURRENT USE OF INSULIN (HCC): ICD-10-CM

## 2017-09-13 DIAGNOSIS — Z51.81 THERAPEUTIC DRUG MONITORING: Primary | ICD-10-CM

## 2017-09-13 PROCEDURE — 99213 OFFICE O/P EST LOW 20 MIN: CPT | Performed by: NURSE PRACTITIONER

## 2017-09-13 RX ORDER — PHENTERMINE HYDROCHLORIDE 15 MG/1
15 CAPSULE ORAL EVERY MORNING
Qty: 30 CAPSULE | Refills: 0 | Status: SHIPPED | OUTPATIENT
Start: 2017-09-13 | End: 2017-10-11

## 2017-09-13 RX ORDER — METFORMIN HYDROCHLORIDE 750 MG/1
1500 TABLET, EXTENDED RELEASE ORAL
Qty: 60 TABLET | Refills: 1 | Status: SHIPPED | OUTPATIENT
Start: 2017-09-13 | End: 2017-11-11

## 2017-09-13 RX ORDER — BUPROPION HYDROCHLORIDE 150 MG/1
TABLET ORAL
Qty: 60 TABLET | Refills: 0 | Status: SHIPPED | OUTPATIENT
Start: 2017-09-13 | End: 2017-10-11

## 2017-09-13 NOTE — PATIENT INSTRUCTIONS
Continue making lifestyle changes that focus on good nutrition, regular exercise and stress management. Medication Plan: Continue phentermine, metformin and jardiance at current dose. Remain off Contrave.  Resume Wellbutrin XL starting at 1 tab daily for your healthcare professional's instructions. Learning About Serving and Portion Sizes     A good rule of thumb: Devote half your plate to vegetables and green salad. Split the other half between protein and starchy carbohydrates.  Fruit makes a good de food at once. But to keep from eating too much at one meal, learn how to manage your portions. A portion is the amount of each type of food on your plate. See the plate diagram for an example of balanced portions.        Ounces: 2 to 3 ounces is about the s

## 2017-09-13 NOTE — PROGRESS NOTES
Eunice Aragon is a 47year old female presents today for 7 month follow-up on medical weight loss program for the treatment of overweight, obesity, or morbid obesity with associated controlled type 2 diabetes.     S:  Current weight Wt Readings from [facility] ASSESSMENT AND PLAN:  Therapeutic drug monitoring  (primary encounter diagnosis)  Severe obesity (bmi 35.0-39.9) (hcc)  Type 2 diabetes mellitus without complication, without long-term current use of insulin (hcc)      Orders Placed This Encounter      Bas Continue making lifestyle changes that focus on good nutrition, regular exercise and stress management. Medication Plan: Continue phentermine, metformin and jardiance at current dose. Remain off Contrave.  Resume Wellbutrin XL starting at 1 tab daily for © 2946-3118 The [address] [place] [address], [address] [place]. All rights reserved. This information is not intended as a substitute for professional medical care. Always follow your healthcare professional's instructions.       Learning When you’re planning for a snack or a meal, keep servings in mind. If you don’t have measuring cups or a scale handy, there are ways to [facility] serving sizes, such as comparing your food to the size of your hand (see pictures above).   Managing portion si

## 2017-10-11 ENCOUNTER — OFFICE VISIT (OUTPATIENT)
Dept: INTERNAL MEDICINE CLINIC | Facility: CLINIC | Age: 52
End: 2017-10-11

## 2017-10-11 VITALS
WEIGHT: 234 LBS | BODY MASS INDEX: 35.46 KG/M2 | DIASTOLIC BLOOD PRESSURE: 82 MMHG | HEART RATE: 88 BPM | HEIGHT: 68 IN | SYSTOLIC BLOOD PRESSURE: 124 MMHG | RESPIRATION RATE: 16 BRPM

## 2017-10-11 DIAGNOSIS — Z51.81 THERAPEUTIC DRUG MONITORING: Primary | ICD-10-CM

## 2017-10-11 DIAGNOSIS — E11.9 TYPE 2 DIABETES MELLITUS WITHOUT COMPLICATION, WITHOUT LONG-TERM CURRENT USE OF INSULIN (HCC): ICD-10-CM

## 2017-10-11 DIAGNOSIS — E66.01 SEVERE OBESITY (BMI 35.0-39.9): ICD-10-CM

## 2017-10-11 PROCEDURE — 99213 OFFICE O/P EST LOW 20 MIN: CPT | Performed by: NURSE PRACTITIONER

## 2017-10-11 RX ORDER — METFORMIN HYDROCHLORIDE 750 MG/1
1500 TABLET, EXTENDED RELEASE ORAL
Qty: 60 TABLET | Refills: 1 | Status: CANCELLED | OUTPATIENT
Start: 2017-10-11

## 2017-10-11 RX ORDER — BUPROPION HYDROCHLORIDE 150 MG/1
300 TABLET ORAL DAILY
Qty: 60 TABLET | Refills: 5 | Status: SHIPPED | OUTPATIENT
Start: 2017-10-11 | End: 2018-04-25

## 2017-10-11 RX ORDER — PHENTERMINE HYDROCHLORIDE 15 MG/1
15 CAPSULE ORAL EVERY MORNING
Qty: 30 CAPSULE | Refills: 0 | Status: SHIPPED | OUTPATIENT
Start: 2017-10-11 | End: 2017-11-11

## 2017-10-11 NOTE — PROGRESS NOTES
Clarisa Aragon is a 47year old female presents today for 8 month follow-up on medical weight loss program for the treatment of overweight, obesity, or morbid obesity with associated controlled type 2 diabetes.     S:  Current weight Wt Readings from [employer] encounter. Meds & Refills for this Visit:    Signed Prescriptions Disp Refills    Phentermine HCl 15 MG Oral Cap 30 capsule 0      Sig: Take 1 capsule (15 mg total) by mouth every morning.       BuPROPion HCl ER, XL, 150 MG Oral Tablet 24 Hr 60 tablet along the way and to have realistic and achievable goals. There are things you can do to keep yourself on the path to success. Don’t focus on daily weight gains and losses. Instead, weigh yourself no more than once a week at the same time of day.  Weighing of Nutrition and Dieteticswww. eatright. org  · Elena.co.uk. gov  Date Last Reviewed: 2/4/2016  © 3634-5913 [address]. All rights reserved.  This information is not intended as a subst

## 2017-10-11 NOTE — PATIENT INSTRUCTIONS
Continue making lifestyle changes that focus on good nutrition, regular exercise and stress management. Medication Plan: Continue phentermine, metformin and Wellbutrin XL at current dose. Increase Jardiance to 25 mg daily- samples x2 weeks provided. meet your goals:  · If you don’t meet a goal, don’t use it as an excuse to give up on your whole plan. Adjust your goal and try again. · Try to understand your own attitude about food.  Are you subject to emotional eating?   · Learn how to accept complimen

## 2017-11-11 ENCOUNTER — OFFICE VISIT (OUTPATIENT)
Dept: INTERNAL MEDICINE CLINIC | Facility: CLINIC | Age: 52
End: 2017-11-11

## 2017-11-11 VITALS
HEART RATE: 80 BPM | SYSTOLIC BLOOD PRESSURE: 140 MMHG | BODY MASS INDEX: 35.01 KG/M2 | RESPIRATION RATE: 16 BRPM | DIASTOLIC BLOOD PRESSURE: 92 MMHG | HEIGHT: 68 IN | WEIGHT: 231 LBS

## 2017-11-11 DIAGNOSIS — Z51.81 THERAPEUTIC DRUG MONITORING: Primary | ICD-10-CM

## 2017-11-11 DIAGNOSIS — E66.01 SEVERE OBESITY (BMI 35.0-39.9): ICD-10-CM

## 2017-11-11 DIAGNOSIS — E11.9 TYPE 2 DIABETES MELLITUS WITHOUT COMPLICATION, WITHOUT LONG-TERM CURRENT USE OF INSULIN (HCC): ICD-10-CM

## 2017-11-11 PROCEDURE — 99213 OFFICE O/P EST LOW 20 MIN: CPT | Performed by: NURSE PRACTITIONER

## 2017-11-11 RX ORDER — PHENTERMINE HYDROCHLORIDE 15 MG/1
15 CAPSULE ORAL EVERY MORNING
Qty: 30 CAPSULE | Refills: 1 | Status: SHIPPED | OUTPATIENT
Start: 2017-11-11 | End: 2018-04-25 | Stop reason: DRUGHIGH

## 2017-11-11 RX ORDER — METFORMIN HYDROCHLORIDE 750 MG/1
1500 TABLET, EXTENDED RELEASE ORAL
Qty: 60 TABLET | Refills: 5 | Status: SHIPPED | OUTPATIENT
Start: 2017-11-11 | End: 2018-04-25

## 2017-11-11 NOTE — PATIENT INSTRUCTIONS
Congratulations on 3# weight loss! Continue making lifestyle changes that focus on good nutrition, regular exercise and stress management. Medication Plan: Continue current medication regimen.      Agreed upon goal/s before next office visit include: Gre class  · Join a team sport  · Take a dance class  · Walk the dog  · Ride a bike  If you have health problems, be sure to ask your healthcare provider before you start an exercise program. Have a  help you develop a plan that’s safe for

## 2017-11-11 NOTE — PROGRESS NOTES
Mat Aragon is a 47year old female presents today for 8 month follow-up on medical weight loss program for the treatment of overweight, obesity, or morbid obesity with associated controlled type 2 diabetes.     S:  Current weight Wt Readings from [employer] complication, without long-term current use of insulin (hcc)    No orders of the defined types were placed in this encounter.       Meds & Refills for this Visit:    Signed Prescriptions Disp Refills    Phentermine HCl 15 MG Oral Cap 30 capsule 1      Sig: Make exercise an important part of your weight-management plan. Make activity part of your day  You may not think you have the time to exercise. But you can work activity into your daily life—you just need to be committed.  Take 10 minutes out of your lunc 1/11/2018) for weight mangement. Patient verbalizes understanding.

## 2018-01-17 ENCOUNTER — OFFICE VISIT (OUTPATIENT)
Dept: INTERNAL MEDICINE CLINIC | Facility: CLINIC | Age: 53
End: 2018-01-17

## 2018-01-17 VITALS
WEIGHT: 239 LBS | HEIGHT: 68 IN | SYSTOLIC BLOOD PRESSURE: 124 MMHG | RESPIRATION RATE: 16 BRPM | DIASTOLIC BLOOD PRESSURE: 80 MMHG | HEART RATE: 84 BPM | BODY MASS INDEX: 36.22 KG/M2

## 2018-01-17 DIAGNOSIS — E66.01 SEVERE OBESITY (BMI 35.0-39.9): ICD-10-CM

## 2018-01-17 DIAGNOSIS — E11.9 TYPE 2 DIABETES MELLITUS WITHOUT COMPLICATION, WITHOUT LONG-TERM CURRENT USE OF INSULIN (HCC): ICD-10-CM

## 2018-01-17 DIAGNOSIS — Z51.81 THERAPEUTIC DRUG MONITORING: Primary | ICD-10-CM

## 2018-01-17 PROCEDURE — 99213 OFFICE O/P EST LOW 20 MIN: CPT | Performed by: NURSE PRACTITIONER

## 2018-01-17 RX ORDER — PHENTERMINE HYDROCHLORIDE 37.5 MG/1
37.5 TABLET ORAL
Qty: 30 TABLET | Refills: 0 | Status: SHIPPED | OUTPATIENT
Start: 2018-01-17 | End: 2018-03-15

## 2018-01-17 RX ORDER — PHENTERMINE HYDROCHLORIDE 15 MG/1
15 CAPSULE ORAL EVERY MORNING
Qty: 30 CAPSULE | Refills: 1 | Status: CANCELLED | OUTPATIENT
Start: 2018-01-17

## 2018-01-17 NOTE — PROGRESS NOTES
Jamir Aragon is a 47year old female presents today for 10 month follow-up on medical weight loss program for the treatment of overweight, obesity, or morbid obesity with associated controlled type 2 diabetes.     S:  Current weight Wt Readings from L mellitus without complication, without long-term current use of insulin (hcc)    No orders of the defined types were placed in this encounter.       Meds & Refills for this Visit:    Signed Prescriptions Disp Refills    Empagliflozin (JARDIANCE) 25 MG Oral new YOU! Use that frustration/anger to motivate change, you make the change!     Patient Resources:    Personal Training/Fitness Classes    · [address] @ [facility]. fitness center with g

## 2018-01-17 NOTE — PATIENT INSTRUCTIONS
Continue making lifestyle changes that focus on good nutrition, regular exercise and stress management. Medication Plan: Continue current medication regimen, aside from increase phentermine to 37.5 mg daily and start Belviq XR daily.     Agreed upon goal

## 2018-01-23 DIAGNOSIS — E66.01 SEVERE OBESITY (BMI 35.0-39.9): ICD-10-CM

## 2018-01-23 DIAGNOSIS — Z51.81 THERAPEUTIC DRUG MONITORING: ICD-10-CM

## 2018-01-24 RX ORDER — PHENTERMINE HYDROCHLORIDE 15 MG/1
CAPSULE ORAL
Qty: 30 CAPSULE | Refills: 0 | OUTPATIENT
Start: 2018-01-24

## 2018-01-24 NOTE — TELEPHONE ENCOUNTER
Requesting Phentermine 15 mg  LOV: 1/17/18  RTC: 1 mth  Last Relevant Labs:   Filled: 11/11/17 #30 with 1 refills    Future Appointments  Date Time Provider Yoselyn Mcconnell   2/21/2018 5:20 PM JANES Aranda [facility]   3/26/2018 10:00 A

## 2018-03-15 DIAGNOSIS — E66.01 SEVERE OBESITY (BMI 35.0-39.9): ICD-10-CM

## 2018-03-15 DIAGNOSIS — Z51.81 THERAPEUTIC DRUG MONITORING: ICD-10-CM

## 2018-03-15 RX ORDER — PHENTERMINE HYDROCHLORIDE 37.5 MG/1
37.5 TABLET ORAL
Qty: 30 TABLET | Refills: 0 | Status: SHIPPED | OUTPATIENT
Start: 2018-03-15 | End: 2018-04-25

## 2018-03-15 NOTE — TELEPHONE ENCOUNTER
Requesting Phentermine HCl 37.5 MG Oral Tab     LOV: 1/17/18  RTC: 1 mth  Last Relevant Labs:   Filled: 1/17/18 #30 with 0 refills    Future Appointments  Date Time Provider Yoselyn Mcconnell   3/26/2018 10:00 AM JANES Freeman [facility]

## 2018-04-25 ENCOUNTER — OFFICE VISIT (OUTPATIENT)
Dept: INTERNAL MEDICINE CLINIC | Facility: CLINIC | Age: 53
End: 2018-04-25

## 2018-04-25 VITALS
HEART RATE: 103 BPM | DIASTOLIC BLOOD PRESSURE: 70 MMHG | RESPIRATION RATE: 16 BRPM | OXYGEN SATURATION: 99 % | HEIGHT: 68 IN | WEIGHT: 236 LBS | SYSTOLIC BLOOD PRESSURE: 120 MMHG | BODY MASS INDEX: 35.77 KG/M2

## 2018-04-25 DIAGNOSIS — E11.9 TYPE 2 DIABETES MELLITUS WITHOUT COMPLICATION, WITHOUT LONG-TERM CURRENT USE OF INSULIN (HCC): ICD-10-CM

## 2018-04-25 DIAGNOSIS — E89.0 POSTOPERATIVE HYPOTHYROIDISM: ICD-10-CM

## 2018-04-25 DIAGNOSIS — E66.01 SEVERE OBESITY (BMI 35.0-39.9): ICD-10-CM

## 2018-04-25 DIAGNOSIS — Z51.81 THERAPEUTIC DRUG MONITORING: Primary | ICD-10-CM

## 2018-04-25 PROCEDURE — 99213 OFFICE O/P EST LOW 20 MIN: CPT | Performed by: NURSE PRACTITIONER

## 2018-04-25 RX ORDER — PHENTERMINE HYDROCHLORIDE 37.5 MG/1
37.5 TABLET ORAL
Qty: 30 TABLET | Refills: 0 | Status: SHIPPED | OUTPATIENT
Start: 2018-04-25 | End: 2018-04-25

## 2018-04-25 RX ORDER — PHENTERMINE HYDROCHLORIDE 37.5 MG/1
37.5 TABLET ORAL
Qty: 30 TABLET | Refills: 0 | Status: SHIPPED | OUTPATIENT
Start: 2018-04-25 | End: 2018-05-30

## 2018-04-25 RX ORDER — METFORMIN HYDROCHLORIDE 750 MG/1
1500 TABLET, EXTENDED RELEASE ORAL
Qty: 60 TABLET | Refills: 5 | Status: SHIPPED | OUTPATIENT
Start: 2018-04-25 | End: 2019-01-06

## 2018-04-25 RX ORDER — BUPROPION HYDROCHLORIDE 150 MG/1
300 TABLET ORAL DAILY
Qty: 60 TABLET | Refills: 5 | Status: SHIPPED | OUTPATIENT
Start: 2018-04-25 | End: 2018-11-20

## 2018-04-25 NOTE — PATIENT INSTRUCTIONS
Congratulations on 3# weight loss! Continue making lifestyle changes that focus on good nutrition, regular exercise and stress management. Medication Plan: Continue current medication regimen, restart phentermine.  Start phentermine at 1/2 tab daily in A

## 2018-04-25 NOTE — PROGRESS NOTES
Kathy Aragon is a 47year old female presents today for >1 year follow-up on medical weight loss program for the treatment of overweight, obesity, or morbid obesity with associated controlled type 2 diabetes.     S:  Current weight Wt Readings from [employer] edema.  NEURO/MS: motor and sensory grossly intact  PSYCH: pleasant, cooperative, normal mood and affect    ASSESSMENT AND PLAN:  Therapeutic drug monitoring  (primary encounter diagnosis)  Severe obesity (bmi 35.0-39.9) (hcc)  Type 2 diabetes mellitus wit cancer. Continue with outside counseling.  on nutrition and fitness, consider IF for 12 hours, water only, 3days/week. Reminded to recheck labs previously ordered. Pt appears poorly motivated.  See patient instructions below for additional plans and

## 2018-05-16 ENCOUNTER — LAB ENCOUNTER (OUTPATIENT)
Dept: LAB | Age: 53
End: 2018-05-16
Attending: OTOLARYNGOLOGY
Payer: COMMERCIAL

## 2018-05-16 DIAGNOSIS — Z51.81 THERAPEUTIC DRUG MONITORING: ICD-10-CM

## 2018-05-16 DIAGNOSIS — E11.9 TYPE 2 DIABETES MELLITUS WITHOUT COMPLICATION, WITHOUT LONG-TERM CURRENT USE OF INSULIN (HCC): ICD-10-CM

## 2018-05-16 DIAGNOSIS — E07.9 THYROID DYSFUNCTION: ICD-10-CM

## 2018-05-16 PROCEDURE — 36415 COLL VENOUS BLD VENIPUNCTURE: CPT

## 2018-05-16 PROCEDURE — 84439 ASSAY OF FREE THYROXINE: CPT

## 2018-05-16 PROCEDURE — 83036 HEMOGLOBIN GLYCOSYLATED A1C: CPT

## 2018-05-16 PROCEDURE — 80048 BASIC METABOLIC PNL TOTAL CA: CPT

## 2018-05-16 PROCEDURE — 84443 ASSAY THYROID STIM HORMONE: CPT

## 2018-05-30 ENCOUNTER — OFFICE VISIT (OUTPATIENT)
Dept: INTERNAL MEDICINE CLINIC | Facility: CLINIC | Age: 53
End: 2018-05-30

## 2018-05-30 DIAGNOSIS — E66.01 SEVERE OBESITY (BMI 35.0-39.9): ICD-10-CM

## 2018-05-30 DIAGNOSIS — Z51.81 THERAPEUTIC DRUG MONITORING: Primary | ICD-10-CM

## 2018-05-30 PROCEDURE — 99998 NO SHOW: CPT | Performed by: NURSE PRACTITIONER

## 2018-05-30 RX ORDER — PHENTERMINE HYDROCHLORIDE 37.5 MG/1
37.5 TABLET ORAL
Qty: 30 TABLET | Refills: 0 | Status: CANCELLED | OUTPATIENT
Start: 2018-05-30

## 2018-05-31 ENCOUNTER — TELEPHONE (OUTPATIENT)
Dept: INTERNAL MEDICINE CLINIC | Facility: CLINIC | Age: 53
End: 2018-05-31

## 2018-05-31 NOTE — TELEPHONE ENCOUNTER
Called and left message for pt to call. Wanted to let patient know we locked the doors yesterday at 6:50. Patient was scheduled at 6.20. We can offer to r/s appointment for another time/day.

## 2018-05-31 NOTE — TELEPHONE ENCOUNTER
Pts  LVM stating that his wifes appt on 5/30 @ 121 but that apparently the office was closed.  He went on to say that his wife will now be receiving care from Dr. Michaela Tse.

## 2018-06-01 NOTE — TELEPHONE ENCOUNTER
Patient's  Tracy Thomas that office must have been closed as he called at 4:31pm (phones turn off at 4:30). He stated that his wife will now be seeing Dr. Michael Rojas at [facility], and to cancel future appointments.

## 2018-06-12 ENCOUNTER — TELEPHONE (OUTPATIENT)
Dept: HEMATOLOGY/ONCOLOGY | Facility: HOSPITAL | Age: 53
End: 2018-06-12

## 2018-06-12 DIAGNOSIS — C50.919 MALIGNANT NEOPLASM OF BREAST (FEMALE) (HCC): Primary | ICD-10-CM

## 2018-06-14 ENCOUNTER — HOSPITAL ENCOUNTER (EMERGENCY)
Age: 53
Discharge: HOME | End: 2018-06-14
Payer: COMMERCIAL

## 2018-06-14 VITALS
SYSTOLIC BLOOD PRESSURE: 137 MMHG | BODY MASS INDEX: 37.04 KG/M2 | HEIGHT: 67 IN | DIASTOLIC BLOOD PRESSURE: 87 MMHG | WEIGHT: 236 LBS | OXYGEN SATURATION: 97 % | RESPIRATION RATE: 16 BRPM | TEMPERATURE: 98 F | HEART RATE: 84 BPM

## 2018-06-14 DIAGNOSIS — B02.8 HERPES ZOSTER WITH COMPLICATION: Primary | ICD-10-CM

## 2018-06-14 PROCEDURE — 85025 COMPLETE CBC W/AUTO DIFF WBC: CPT | Performed by: NURSE PRACTITIONER

## 2018-06-14 PROCEDURE — 99283 EMERGENCY DEPT VISIT LOW MDM: CPT

## 2018-06-14 PROCEDURE — 36415 COLL VENOUS BLD VENIPUNCTURE: CPT

## 2018-06-14 PROCEDURE — 80053 COMPREHEN METABOLIC PANEL: CPT | Performed by: NURSE PRACTITIONER

## 2018-06-14 RX ORDER — ACYCLOVIR 800 MG/1
800 TABLET ORAL
Qty: 35 TABLET | Refills: 0 | Status: SHIPPED | OUTPATIENT
Start: 2018-06-14 | End: 2018-06-21

## 2018-06-14 RX ORDER — TRAMADOL HYDROCHLORIDE 50 MG/1
50 TABLET ORAL EVERY 6 HOURS PRN
Qty: 20 TABLET | Refills: 0 | Status: SHIPPED | OUTPATIENT
Start: 2018-06-14 | End: 2018-06-21

## 2018-06-14 RX ORDER — HYDROCODONE BITARTRATE AND ACETAMINOPHEN 5; 325 MG/1; MG/1
1 TABLET ORAL ONCE
Status: COMPLETED | OUTPATIENT
Start: 2018-06-14 | End: 2018-06-14

## 2018-06-14 NOTE — ED INITIAL ASSESSMENT (HPI)
Pt reports rt side flank and abd pain for the past few days with numbness then she developed rash to area after pain began.

## 2018-06-14 NOTE — ED PROVIDER NOTES
Patient Seen in: THE [facility] In [place]    History   Patient presents with:  Abdomen/Flank Pain (GI/)    Stated Complaint: abd pain, numbness to hip    15-year-old female who presents to the emergency room with complaints of right lo Smokeless tobacco: Never Used                      Alcohol use: Yes           0.0 oz/week     Comment: rare      Review of Systems   Constitutional: Negative. HENT: Negative. Gastrointestinal: Positive for abdominal pain and nausea.    Skin: Positive Psychiatric: She has a normal mood and affect. Her behavior is normal. Judgment and thought content normal.   Nursing note and vitals reviewed.          ED Course     Labs Reviewed   COMP METABOLIC PANEL (14) - Abnormal; Notable for the following:        Re As needed, If symptoms worsen        Medications Prescribed:  Discharge Medication List as of 6/14/2018  1:01 PM    START taking these medications    acyclovir 800 MG Oral Tab  Take 1 tablet (800 mg total) by mouth 5 (five) times daily. , Normal, Disp-35 ta

## 2018-06-28 ENCOUNTER — HOSPITAL ENCOUNTER (OUTPATIENT)
Dept: ULTRASOUND IMAGING | Age: 53
Discharge: HOME | End: 2018-06-28
Attending: OTOLARYNGOLOGY
Payer: COMMERCIAL

## 2018-06-28 DIAGNOSIS — E04.1 THYROID NODULE: ICD-10-CM

## 2018-06-28 DIAGNOSIS — K11.8 PAROTID MASS: ICD-10-CM

## 2018-06-28 PROCEDURE — 76536 US EXAM OF HEAD AND NECK: CPT | Performed by: OTOLARYNGOLOGY

## 2018-06-29 NOTE — PROGRESS NOTES
Per phone consent [phone] Cell. Left detailed message with results and recommendations. Future order placed.  Released and message sent via [employer]

## 2018-07-02 ENCOUNTER — HOSPITAL ENCOUNTER (OUTPATIENT)
Dept: MAMMOGRAPHY | Age: 53
Discharge: HOME | End: 2018-07-02
Attending: INTERNAL MEDICINE
Payer: COMMERCIAL

## 2018-07-02 DIAGNOSIS — C50.919 MALIGNANT NEOPLASM OF BREAST (FEMALE) (HCC): ICD-10-CM

## 2018-07-02 PROCEDURE — 77061 BREAST TOMOSYNTHESIS UNI: CPT | Performed by: INTERNAL MEDICINE

## 2018-07-02 PROCEDURE — 77065 DX MAMMO INCL CAD UNI: CPT | Performed by: INTERNAL MEDICINE

## 2018-07-11 ENCOUNTER — OFFICE VISIT (OUTPATIENT)
Dept: HEMATOLOGY/ONCOLOGY | Age: 53
End: 2018-07-11
Attending: INTERNAL MEDICINE
Payer: COMMERCIAL

## 2018-07-11 VITALS
OXYGEN SATURATION: 98 % | RESPIRATION RATE: 18 BRPM | WEIGHT: 237.81 LBS | HEART RATE: 74 BPM | DIASTOLIC BLOOD PRESSURE: 85 MMHG | TEMPERATURE: 99 F | SYSTOLIC BLOOD PRESSURE: 124 MMHG | BODY MASS INDEX: 36 KG/M2

## 2018-07-11 DIAGNOSIS — C50.812 MALIGNANT NEOPLASM OF OVERLAPPING SITES OF LEFT BREAST IN FEMALE, ESTROGEN RECEPTOR POSITIVE (HCC): Primary | ICD-10-CM

## 2018-07-11 DIAGNOSIS — Z17.0 MALIGNANT NEOPLASM OF OVERLAPPING SITES OF LEFT BREAST IN FEMALE, ESTROGEN RECEPTOR POSITIVE (HCC): Primary | ICD-10-CM

## 2018-07-11 PROCEDURE — 99213 OFFICE O/P EST LOW 20 MIN: CPT | Performed by: INTERNAL MEDICINE

## 2018-07-11 NOTE — PROGRESS NOTES
Cancer Center Progress Note  Patient Name: Rena Gerard   YOB: 1965   Medical Record Number: [id number]     Attending Physician: Aracelis Tran M.D. Date of Visit: 7/11/2018    Chief Complaint:  Patient presents with:   Follow first dose of Taxol. The neuropathy has recovered somewhat, but pt was unable to continue on with Taxol and was transitioned to Tamoxifen with a plan for 5 years of hormonal therapy.      She discontinued hormonal therapy after 5 years due to intolerable s Topics Concern    Caffeine Concern Yes    Comment: 1-2 daily    Exercise Yes    Comment: at least 4-5x per week    Seat Belt Yes     Social History Narrative   None on file       Current Medications:    Current Outpatient Prescriptions:   •  Phentermine HC Comment:HCI TABS  Clarithromycin            Clindamycin             NAUSEA AND VOMITING  Dander                  ITCHING, SWELLING    Comment:Hand, feet, eyes  Fish-Derived Produc*    SWELLING    Comment:Fingers/toes     Review of Systems:    Constitutiona sensory or motor deficits, normal cerebellar function, normal gait, cranial nerves intact. Psychiatric Normal - A&Ox3, Coherent speech. Verbalizes understanding of our discussions today. Breasts   Abnormal - Reconstructed L breast w/o masses.   Otto Mijares diagnosis, prognosis, and general treatment was explained to the patient and the family. Electronically Signed by: Amy Terrell M.D.   THE [facility]

## 2018-07-18 PROBLEM — E66.01 SEVERE OBESITY WITH BODY MASS INDEX (BMI) OF 35.0 TO 39.9 WITH SERIOUS COMORBIDITY (HCC): Status: ACTIVE | Noted: 2017-02-08

## 2018-08-21 PROCEDURE — 88175 CYTOPATH C/V AUTO FLUID REDO: CPT | Performed by: NURSE PRACTITIONER

## 2018-09-27 ENCOUNTER — HOSPITAL ENCOUNTER (OUTPATIENT)
Dept: ULTRASOUND IMAGING | Age: 53
Discharge: HOME | End: 2018-09-27
Attending: SURGERY
Payer: COMMERCIAL

## 2018-09-27 ENCOUNTER — TELEPHONE (OUTPATIENT)
Dept: HEMATOLOGY/ONCOLOGY | Facility: HOSPITAL | Age: 53
End: 2018-09-27

## 2018-09-27 DIAGNOSIS — T85.43XA RUPTURED SILICONE BREAST IMPLANT, INITIAL ENCOUNTER: ICD-10-CM

## 2018-09-27 PROCEDURE — 76641 ULTRASOUND BREAST COMPLETE: CPT | Performed by: SURGERY

## 2018-09-27 NOTE — TELEPHONE ENCOUNTER
Pt states she had a breast ultrasound today. She was told she has a cyst.   She would like Dr. Eloina Butts input. She was informed that Doctor is out of town until next week.

## 2018-10-02 ENCOUNTER — TELEPHONE (OUTPATIENT)
Dept: HEMATOLOGY/ONCOLOGY | Facility: HOSPITAL | Age: 53
End: 2018-10-02

## 2018-10-02 NOTE — TELEPHONE ENCOUNTER
Pt left a message. She was returning Dr. Jesus Delcid call. She states she had an ultrasound done on the implant side because Dr. Karina José wanted to check for rupture. She is at the 10 year lin and can have it replaced.    She is not sure why they did th

## 2018-10-17 PROCEDURE — 80299 QUANTITATIVE ASSAY DRUG: CPT | Performed by: INTERNAL MEDICINE

## 2018-10-17 PROCEDURE — 83525 ASSAY OF INSULIN: CPT | Performed by: INTERNAL MEDICINE

## 2018-11-13 ENCOUNTER — TELEPHONE (OUTPATIENT)
Dept: INTERNAL MEDICINE CLINIC | Facility: CLINIC | Age: 53
End: 2018-11-13

## 2018-11-13 DIAGNOSIS — Z51.81 THERAPEUTIC DRUG MONITORING: ICD-10-CM

## 2018-11-13 DIAGNOSIS — E66.01 SEVERE OBESITY WITH BODY MASS INDEX (BMI) OF 35.0 TO 39.9 WITH SERIOUS COMORBIDITY (HCC): ICD-10-CM

## 2018-11-13 RX ORDER — BUPROPION HYDROCHLORIDE 150 MG/1
300 TABLET ORAL DAILY
Qty: 60 TABLET | Refills: 5 | OUTPATIENT
Start: 2018-11-13

## 2018-11-13 NOTE — TELEPHONE ENCOUNTER
Requesting  Requested Prescriptions     Pending Prescriptions Disp Refills   • BuPROPion HCl ER, XL, 150 MG Oral Tablet 24 Hr 60 tablet 5     Sig: Take 2 tablets (300 mg total) by mouth daily.      LOV: 5/30/18  RTC: 1 moth  Filled: 4/25/18 #60 with 5 refil

## 2018-11-17 DIAGNOSIS — Z51.81 THERAPEUTIC DRUG MONITORING: ICD-10-CM

## 2018-11-17 DIAGNOSIS — E66.01 SEVERE OBESITY WITH BODY MASS INDEX (BMI) OF 35.0 TO 39.9 WITH SERIOUS COMORBIDITY (HCC): ICD-10-CM

## 2018-11-19 RX ORDER — BUPROPION HYDROCHLORIDE 150 MG/1
TABLET ORAL
Qty: 60 TABLET | Refills: 0 | OUTPATIENT
Start: 2018-11-19

## 2019-01-06 DIAGNOSIS — E11.9 TYPE 2 DIABETES MELLITUS WITHOUT COMPLICATION, WITHOUT LONG-TERM CURRENT USE OF INSULIN (HCC): ICD-10-CM

## 2019-01-06 DIAGNOSIS — Z51.81 THERAPEUTIC DRUG MONITORING: ICD-10-CM

## 2019-01-06 DIAGNOSIS — E66.01 SEVERE OBESITY WITH BODY MASS INDEX (BMI) OF 35.0 TO 39.9 WITH SERIOUS COMORBIDITY (HCC): ICD-10-CM

## 2019-01-07 RX ORDER — METFORMIN HYDROCHLORIDE 750 MG/1
TABLET, EXTENDED RELEASE ORAL
Qty: 180 TABLET | Refills: 1 | Status: SHIPPED | OUTPATIENT
Start: 2019-01-07 | End: 2019-08-02

## 2019-01-15 PROBLEM — L74.9 SWEATING ABNORMALITY: Status: ACTIVE | Noted: 2019-01-15

## 2019-01-15 PROBLEM — E88.81 INSULIN RESISTANCE: Status: ACTIVE | Noted: 2019-01-15

## 2019-01-15 PROBLEM — E88.819 INSULIN RESISTANCE: Status: ACTIVE | Noted: 2019-01-15

## 2019-02-27 ENCOUNTER — HOSPITAL ENCOUNTER (OUTPATIENT)
Dept: CT IMAGING | Age: 54
Discharge: HOME | End: 2019-02-27
Attending: OTOLARYNGOLOGY
Payer: COMMERCIAL

## 2019-02-27 DIAGNOSIS — K11.8 PAROTID MASS: ICD-10-CM

## 2019-02-27 PROCEDURE — 70492 CT SFT TSUE NCK W/O & W/DYE: CPT | Performed by: OTOLARYNGOLOGY

## 2019-03-25 ENCOUNTER — HOSPITAL ENCOUNTER (OUTPATIENT)
Dept: CV DIAGNOSTICS | Age: 54
Discharge: HOME | End: 2019-03-25
Attending: INTERNAL MEDICINE
Payer: COMMERCIAL

## 2019-03-25 DIAGNOSIS — R01.1 HEART MURMUR: ICD-10-CM

## 2019-03-25 PROCEDURE — 93306 TTE W/DOPPLER COMPLETE: CPT | Performed by: INTERNAL MEDICINE

## 2019-06-14 ENCOUNTER — HOSPITAL ENCOUNTER (OUTPATIENT)
Dept: MRI IMAGING | Age: 54
Discharge: HOME | End: 2019-06-14
Attending: OTOLARYNGOLOGY
Payer: COMMERCIAL

## 2019-06-14 DIAGNOSIS — R60.0 SWELLING OF RIGHT PAROTID GLAND: ICD-10-CM

## 2019-06-14 PROCEDURE — 70543 MRI ORBT/FAC/NCK W/O &W/DYE: CPT | Performed by: OTOLARYNGOLOGY

## 2019-06-14 PROCEDURE — A9575 INJ GADOTERATE MEGLUMI 0.1ML: HCPCS | Performed by: OTOLARYNGOLOGY

## 2019-06-14 PROCEDURE — 82565 ASSAY OF CREATININE: CPT

## 2019-06-18 ENCOUNTER — ORDER TRANSCRIPTION (OUTPATIENT)
Dept: ADMINISTRATIVE | Facility: HOSPITAL | Age: 54
End: 2019-06-18

## 2019-06-18 DIAGNOSIS — K11.8 MASS OF LEFT PAROTID GLAND: Primary | ICD-10-CM

## 2019-06-19 ENCOUNTER — TELEPHONE (OUTPATIENT)
Dept: HEMATOLOGY/ONCOLOGY | Facility: HOSPITAL | Age: 54
End: 2019-06-19

## 2019-06-19 NOTE — TELEPHONE ENCOUNTER
MD Jake Churchill RN   Caller: Unspecified (Today, 10:06 AM)             I agree with the biopsy, but it is extremely unlikely to be related to the breast cancer. Message left for the patient.

## 2019-06-19 NOTE — TELEPHONE ENCOUNTER
Pt left a message that she had a MRI of her neck. She is having a biopsy done tomorrow. She would like Dr. Tu Overton to review her MRI.

## 2019-06-20 ENCOUNTER — LAB ENCOUNTER (OUTPATIENT)
Dept: LAB | Facility: HOSPITAL | Age: 54
End: 2019-06-20
Attending: OTOLARYNGOLOGY
Payer: COMMERCIAL

## 2019-06-20 ENCOUNTER — HOSPITAL ENCOUNTER (OUTPATIENT)
Dept: ULTRASOUND IMAGING | Facility: HOSPITAL | Age: 54
Discharge: HOME | End: 2019-06-20
Attending: OTOLARYNGOLOGY
Payer: COMMERCIAL

## 2019-06-20 VITALS
RESPIRATION RATE: 13 BRPM | SYSTOLIC BLOOD PRESSURE: 126 MMHG | OXYGEN SATURATION: 96 % | HEART RATE: 83 BPM | DIASTOLIC BLOOD PRESSURE: 84 MMHG

## 2019-06-20 DIAGNOSIS — K11.8 MASS OF RIGHT PAROTID GLAND: ICD-10-CM

## 2019-06-20 DIAGNOSIS — K11.8 PAROTID NODULE: Primary | ICD-10-CM

## 2019-06-20 PROCEDURE — 88184 FLOWCYTOMETRY/ TC 1 MARKER: CPT

## 2019-06-20 PROCEDURE — 76942 ECHO GUIDE FOR BIOPSY: CPT | Performed by: OTOLARYNGOLOGY

## 2019-06-20 PROCEDURE — 88172 CYTP DX EVAL FNA 1ST EA SITE: CPT | Performed by: OTOLARYNGOLOGY

## 2019-06-20 PROCEDURE — 42400 BIOPSY OF SALIVARY GLAND: CPT | Performed by: OTOLARYNGOLOGY

## 2019-06-20 PROCEDURE — 88185 FLOWCYTOMETRY/TC ADD-ON: CPT

## 2019-06-20 PROCEDURE — 88305 TISSUE EXAM BY PATHOLOGIST: CPT | Performed by: OTOLARYNGOLOGY

## 2019-06-20 PROCEDURE — 88173 CYTOPATH EVAL FNA REPORT: CPT | Performed by: OTOLARYNGOLOGY

## 2019-06-20 NOTE — PROCEDURES
[facility]  Procedure Note    Cristino Stroud Patient Status:  Outpatient    6/3/1965 MRN [id number]   Location [address] [place] [address] Attending Taylor Ortiz MD   [facility] Day # 0 PCP Primitivo Jameson MD     Procedure: right parotid FNA and core

## 2019-07-16 PROBLEM — K11.9 LESION OF PAROTID GLAND: Status: ACTIVE | Noted: 2019-07-16

## 2019-07-16 PROCEDURE — 82397 CHEMILUMINESCENT ASSAY: CPT | Performed by: INTERNAL MEDICINE

## 2019-07-16 PROCEDURE — 83525 ASSAY OF INSULIN: CPT | Performed by: INTERNAL MEDICINE

## 2019-09-04 PROCEDURE — 88175 CYTOPATH C/V AUTO FLUID REDO: CPT | Performed by: NURSE PRACTITIONER

## 2019-09-06 ENCOUNTER — TELEPHONE (OUTPATIENT)
Dept: HEMATOLOGY/ONCOLOGY | Facility: HOSPITAL | Age: 54
End: 2019-09-06

## 2019-09-06 DIAGNOSIS — C50.812 MALIGNANT NEOPLASM OF OVERLAPPING SITES OF LEFT BREAST IN FEMALE, ESTROGEN RECEPTOR POSITIVE (HCC): Primary | ICD-10-CM

## 2019-09-06 DIAGNOSIS — Z17.0 MALIGNANT NEOPLASM OF OVERLAPPING SITES OF LEFT BREAST IN FEMALE, ESTROGEN RECEPTOR POSITIVE (HCC): Primary | ICD-10-CM

## 2019-09-26 ENCOUNTER — HOSPITAL ENCOUNTER (OUTPATIENT)
Dept: MAMMOGRAPHY | Age: 54
Discharge: HOME | End: 2019-09-26
Attending: INTERNAL MEDICINE
Payer: COMMERCIAL

## 2019-09-26 DIAGNOSIS — Z17.0 MALIGNANT NEOPLASM OF OVERLAPPING SITES OF LEFT BREAST IN FEMALE, ESTROGEN RECEPTOR POSITIVE (HCC): ICD-10-CM

## 2019-09-26 DIAGNOSIS — C50.812 MALIGNANT NEOPLASM OF OVERLAPPING SITES OF LEFT BREAST IN FEMALE, ESTROGEN RECEPTOR POSITIVE (HCC): ICD-10-CM

## 2019-09-26 PROCEDURE — 77065 DX MAMMO INCL CAD UNI: CPT | Performed by: INTERNAL MEDICINE

## 2019-09-26 PROCEDURE — 77061 BREAST TOMOSYNTHESIS UNI: CPT | Performed by: INTERNAL MEDICINE

## 2019-12-27 PROBLEM — Z91.011 MILK ALLERGY: Status: ACTIVE | Noted: 2019-12-27

## 2019-12-27 PROBLEM — Z91.012 EGG ALLERGY: Status: ACTIVE | Noted: 2019-12-27

## 2020-12-19 ENCOUNTER — LAB ENCOUNTER (OUTPATIENT)
Dept: LAB | Facility: HOSPITAL | Age: 55
End: 2020-12-19
Attending: INTERNAL MEDICINE
Payer: COMMERCIAL

## 2020-12-19 DIAGNOSIS — E07.9 THYROID DYSFUNCTION: ICD-10-CM

## 2020-12-19 DIAGNOSIS — E11.9 TYPE 2 DIABETES MELLITUS WITHOUT COMPLICATION, WITHOUT LONG-TERM CURRENT USE OF INSULIN (HCC): ICD-10-CM

## 2020-12-19 PROCEDURE — 82570 ASSAY OF URINE CREATININE: CPT

## 2020-12-19 PROCEDURE — 84439 ASSAY OF FREE THYROXINE: CPT

## 2020-12-19 PROCEDURE — 82043 UR ALBUMIN QUANTITATIVE: CPT

## 2020-12-19 PROCEDURE — 36415 COLL VENOUS BLD VENIPUNCTURE: CPT

## 2020-12-19 PROCEDURE — 84443 ASSAY THYROID STIM HORMONE: CPT

## 2021-02-11 ENCOUNTER — LAB ENCOUNTER (OUTPATIENT)
Dept: LAB | Age: 56
End: 2021-02-11
Attending: OTOLARYNGOLOGY
Payer: COMMERCIAL

## 2021-02-11 DIAGNOSIS — E07.9 THYROID DYSFUNCTION: ICD-10-CM

## 2021-02-11 LAB
T4 FREE SERPL-MCNC: 1.3 NG/DL (ref 0.8–1.7)
TSI SER-ACNC: 0.13 MIU/ML (ref 0.36–3.74)

## 2021-02-11 PROCEDURE — 36415 COLL VENOUS BLD VENIPUNCTURE: CPT

## 2021-02-11 PROCEDURE — 84439 ASSAY OF FREE THYROXINE: CPT

## 2021-02-11 PROCEDURE — 84443 ASSAY THYROID STIM HORMONE: CPT

## 2021-03-13 ENCOUNTER — HOSPITAL ENCOUNTER (EMERGENCY)
Age: 56
Discharge: HOME | End: 2021-03-13
Attending: EMERGENCY MEDICINE
Payer: COMMERCIAL

## 2021-03-13 ENCOUNTER — APPOINTMENT (OUTPATIENT)
Dept: CT IMAGING | Age: 56
End: 2021-03-13
Attending: EMERGENCY MEDICINE
Payer: COMMERCIAL

## 2021-03-13 VITALS
BODY MASS INDEX: 34.86 KG/M2 | WEIGHT: 230 LBS | TEMPERATURE: 97 F | OXYGEN SATURATION: 98 % | DIASTOLIC BLOOD PRESSURE: 72 MMHG | RESPIRATION RATE: 16 BRPM | SYSTOLIC BLOOD PRESSURE: 140 MMHG | HEART RATE: 80 BPM | HEIGHT: 68 IN

## 2021-03-13 DIAGNOSIS — S00.83XA CONTUSION OF FACE, INITIAL ENCOUNTER: Primary | ICD-10-CM

## 2021-03-13 DIAGNOSIS — T07.XXXA ABRASIONS OF MULTIPLE SITES: ICD-10-CM

## 2021-03-13 PROCEDURE — 70450 CT HEAD/BRAIN W/O DYE: CPT | Performed by: EMERGENCY MEDICINE

## 2021-03-13 PROCEDURE — 99284 EMERGENCY DEPT VISIT MOD MDM: CPT

## 2021-03-13 PROCEDURE — 76377 3D RENDER W/INTRP POSTPROCES: CPT | Performed by: EMERGENCY MEDICINE

## 2021-03-13 PROCEDURE — 90471 IMMUNIZATION ADMIN: CPT

## 2021-03-13 PROCEDURE — 70486 CT MAXILLOFACIAL W/O DYE: CPT | Performed by: EMERGENCY MEDICINE

## 2021-03-13 NOTE — ED INITIAL ASSESSMENT (HPI)
Pt states she was walking her dog when the dog pulled her down, pt states she landed on her knees and hands and struck her face. Pt unknown LOC, has swelling and abrasion above nose. Pt also has abrasion to right hand and right knee.  +ROM in all extremitie

## 2021-03-13 NOTE — ED PROVIDER NOTES
Patient Seen in: THE [facility] Emergency Department In [place]      History   Patient presents with:  Fall    Stated Complaint: fall - face first     HPI/Subjective:   HPI    81-year-old female presents emergency room for evaluation after fall.   Patient stat 3/2011    US guided biopsy - benign   • OTHER      total thyroidectomy   • REDUCTION RIGHT  2010   • THYROIDECTOMY      goiter and thyroid cancer   • TONSILLECTOMY                  Social History    Tobacco Use      Smoking status: Never Smoker      Smokel drooping, no ptosis, muscle strength +5/5 bilateral upper and lower extremities cerebellar finger to nose intact, no pronator drift, sensation intact.         ED Course   Labs Reviewed - No data to display                MDM      CT the brain facial bones p

## 2021-04-03 ENCOUNTER — LAB ENCOUNTER (OUTPATIENT)
Dept: LAB | Age: 56
End: 2021-04-03
Attending: OTOLARYNGOLOGY
Payer: COMMERCIAL

## 2021-04-03 DIAGNOSIS — E11.9 TYPE 2 DIABETES MELLITUS WITHOUT COMPLICATION, WITHOUT LONG-TERM CURRENT USE OF INSULIN (HCC): ICD-10-CM

## 2021-04-03 DIAGNOSIS — E07.9 THYROID DYSFUNCTION: ICD-10-CM

## 2021-04-03 PROCEDURE — 84439 ASSAY OF FREE THYROXINE: CPT

## 2021-04-03 PROCEDURE — 82397 CHEMILUMINESCENT ASSAY: CPT

## 2021-04-03 PROCEDURE — 83036 HEMOGLOBIN GLYCOSYLATED A1C: CPT

## 2021-04-03 PROCEDURE — 83525 ASSAY OF INSULIN: CPT

## 2021-04-03 PROCEDURE — 80053 COMPREHEN METABOLIC PANEL: CPT

## 2021-04-03 PROCEDURE — 36415 COLL VENOUS BLD VENIPUNCTURE: CPT

## 2021-04-03 PROCEDURE — 82043 UR ALBUMIN QUANTITATIVE: CPT

## 2021-04-03 PROCEDURE — 82570 ASSAY OF URINE CREATININE: CPT

## 2021-04-03 PROCEDURE — 80061 LIPID PANEL: CPT

## 2021-04-03 PROCEDURE — 84443 ASSAY THYROID STIM HORMONE: CPT

## 2021-04-05 NOTE — PROGRESS NOTES
Left detailed message per phone protocol with results and recommendations per   SK. Placed future order. Requested callback if any questions.

## 2022-04-23 ENCOUNTER — HOSPITAL ENCOUNTER (OUTPATIENT)
Dept: MAMMOGRAPHY | Age: 57
Discharge: HOME | End: 2022-04-23
Attending: OBSTETRICS & GYNECOLOGY
Payer: COMMERCIAL

## 2022-04-23 ENCOUNTER — APPOINTMENT (OUTPATIENT)
Dept: LAB | Age: 57
End: 2022-04-23
Attending: OBSTETRICS & GYNECOLOGY
Payer: COMMERCIAL

## 2022-04-23 DIAGNOSIS — Z12.31 ENCOUNTER FOR SCREENING MAMMOGRAM FOR MALIGNANT NEOPLASM OF BREAST: ICD-10-CM

## 2022-04-23 PROCEDURE — 77067 SCR MAMMO BI INCL CAD: CPT | Performed by: OBSTETRICS & GYNECOLOGY

## 2022-04-23 PROCEDURE — 77063 BREAST TOMOSYNTHESIS BI: CPT | Performed by: OBSTETRICS & GYNECOLOGY

## 2023-07-26 ENCOUNTER — OFFICE VISIT (OUTPATIENT)
Dept: HEMATOLOGY/ONCOLOGY | Age: 58
End: 2023-07-26
Attending: INTERNAL MEDICINE
Payer: COMMERCIAL

## 2023-07-26 VITALS
RESPIRATION RATE: 16 BRPM | TEMPERATURE: 99 F | HEART RATE: 88 BPM | OXYGEN SATURATION: 97 % | WEIGHT: 243 LBS | DIASTOLIC BLOOD PRESSURE: 82 MMHG | SYSTOLIC BLOOD PRESSURE: 118 MMHG | BODY MASS INDEX: 38 KG/M2

## 2023-07-26 DIAGNOSIS — Z12.31 ENCOUNTER FOR SCREENING MAMMOGRAM FOR MALIGNANT NEOPLASM OF BREAST: ICD-10-CM

## 2023-07-26 DIAGNOSIS — Z85.3 HISTORY OF BREAST CANCER: Primary | ICD-10-CM

## 2023-07-26 PROCEDURE — 99203 OFFICE O/P NEW LOW 30 MIN: CPT | Performed by: INTERNAL MEDICINE

## 2023-07-26 NOTE — PROGRESS NOTES
Here with spouse for MD fu visit for h/o BRCA  Pt reports no issues @ this time  Mammogram done in 4/2022.   Meds reviewed and updated  Education Record    Learner:  Patient    Disease / Diagnosis    Barriers / Limitations:  None   Comments:    Method:  Brief focused and Printed material   Comments:    General Topics:  Plan of care reviewed   Comments:    Outcome:  Shows understanding   Comments:

## 2023-08-04 ENCOUNTER — HOSPITAL ENCOUNTER (OUTPATIENT)
Dept: MAMMOGRAPHY | Age: 58
Discharge: HOME | End: 2023-08-04
Attending: INTERNAL MEDICINE
Payer: COMMERCIAL

## 2023-08-04 DIAGNOSIS — Z85.3 HISTORY OF BREAST CANCER: ICD-10-CM

## 2023-08-04 DIAGNOSIS — Z12.31 ENCOUNTER FOR SCREENING MAMMOGRAM FOR MALIGNANT NEOPLASM OF BREAST: ICD-10-CM

## 2023-08-04 PROCEDURE — 77067 SCR MAMMO BI INCL CAD: CPT | Performed by: INTERNAL MEDICINE

## 2023-08-04 PROCEDURE — 77063 BREAST TOMOSYNTHESIS BI: CPT | Performed by: INTERNAL MEDICINE

## 2023-11-14 ENCOUNTER — OFFICE VISIT (OUTPATIENT)
Dept: FAMILY MEDICINE CLINIC | Facility: CLINIC | Age: 58
End: 2023-11-14
Payer: COMMERCIAL

## 2023-11-14 VITALS
OXYGEN SATURATION: 98 % | DIASTOLIC BLOOD PRESSURE: 94 MMHG | WEIGHT: 224 LBS | RESPIRATION RATE: 18 BRPM | SYSTOLIC BLOOD PRESSURE: 133 MMHG | TEMPERATURE: 97 F | BODY MASS INDEX: 35.16 KG/M2 | HEART RATE: 84 BPM | HEIGHT: 67 IN

## 2023-11-14 DIAGNOSIS — J04.0 LARYNGITIS: Primary | ICD-10-CM

## 2023-11-14 DIAGNOSIS — J02.9 SORE THROAT: ICD-10-CM

## 2023-11-14 LAB
CONTROL LINE PRESENT WITH A CLEAR BACKGROUND (YES/NO): YES YES/NO
KIT LOT #: NORMAL NUMERIC

## 2023-11-14 PROCEDURE — 99213 OFFICE O/P EST LOW 20 MIN: CPT | Performed by: NURSE PRACTITIONER

## 2023-11-14 PROCEDURE — 87880 STREP A ASSAY W/OPTIC: CPT | Performed by: NURSE PRACTITIONER

## 2023-11-14 PROCEDURE — 3080F DIAST BP >= 90 MM HG: CPT | Performed by: NURSE PRACTITIONER

## 2023-11-14 PROCEDURE — 3075F SYST BP GE 130 - 139MM HG: CPT | Performed by: NURSE PRACTITIONER

## 2023-11-14 PROCEDURE — 3008F BODY MASS INDEX DOCD: CPT | Performed by: NURSE PRACTITIONER

## 2023-11-14 NOTE — PATIENT INSTRUCTIONS
Flonase daily  Benadryl at night  I recommend the 4 H's for inflammation:    1. Heat (warm mist from the shower or warm liquids such as tea)  2. Honey (mixed in your tea or by the spoonful [if you are not diabetic; over the age of 1 year]--take a spoonful 3 times a day and don't eat or drink anything for 15-20 minutes)  3. Humidity--cool mist in the bedroom at night  4.  Hydration --at least 8 -10 glasses a day

## 2023-11-20 ENCOUNTER — OFFICE VISIT (OUTPATIENT)
Dept: FAMILY MEDICINE CLINIC | Facility: CLINIC | Age: 58
End: 2023-11-20
Payer: COMMERCIAL

## 2023-11-20 VITALS
BODY MASS INDEX: 35 KG/M2 | HEIGHT: 67.5 IN | TEMPERATURE: 98 F | SYSTOLIC BLOOD PRESSURE: 118 MMHG | HEART RATE: 94 BPM | RESPIRATION RATE: 18 BRPM | OXYGEN SATURATION: 98 % | DIASTOLIC BLOOD PRESSURE: 79 MMHG

## 2023-11-20 DIAGNOSIS — J01.10 ACUTE NON-RECURRENT FRONTAL SINUSITIS: Primary | ICD-10-CM

## 2023-11-20 PROCEDURE — 99213 OFFICE O/P EST LOW 20 MIN: CPT | Performed by: NURSE PRACTITIONER

## 2023-11-20 PROCEDURE — 3074F SYST BP LT 130 MM HG: CPT | Performed by: NURSE PRACTITIONER

## 2023-11-20 PROCEDURE — 3078F DIAST BP <80 MM HG: CPT | Performed by: NURSE PRACTITIONER

## 2023-11-20 RX ORDER — AMOXICILLIN AND CLAVULANATE POTASSIUM 875; 125 MG/1; MG/1
1 TABLET, FILM COATED ORAL 2 TIMES DAILY
Qty: 14 TABLET | Refills: 0 | Status: SHIPPED | OUTPATIENT
Start: 2023-11-20 | End: 2023-11-27

## 2023-12-29 NOTE — PROGRESS NOTES
Marilyn Altamirano Mahesh is a 47year old female presents today for 5 month follow-up on medical weight loss program for the treatment of overweight, obesity, or morbid obesity with associated controlled type 2 diabetes.     S:  Current weight Wt Readings from [employer] Patient attended Cardiac Rehab Phase 3. Please refer to exercise data found in this note.      12/29/23 0900   Session Information   Phase Phase III   Session Number 3   Pre-Session Evaluation   Is patient on O2? N   Resting Vital Signs   Resting Heart Rate 71   Resting /70   Resting Dyspnea No   SpO2 99 %   Pulse Ox  Mode Intermittent   O2 Device None/Room air   Exercise/Activity   Exercise/Activity Treadmill;Other   Treadmill Exercise Assessment   Treadmill Duration 40   Treadmill Intensity Moderate   Treadmill Adjusted Workload 2.4mph; 2.5%   Other Exercise Assessment   Other Exercise Type NuStep   Other Exercise Duration 19   Other Exercise Intensity Moderate    Other Adjusted Workload L5   During Exercise   Exercise HR 88   Exercise /76   Exercise Dyspnea No   SpO2 96 %   Pulse Ox  Mode Intermittent   O2 Device None/room air   Recovery Vital Signs   Recovery HR 84   Recovery /68   Recovery Dyspnea No   SpO2 97 %   Pulse Ox  Mode Intermittent   O2 Device None/room air        this encounter.       Meds & Refills for this Visit:    Signed Prescriptions Disp Refills    MetFORMIN HCl  MG Oral Tablet 24 Hr 60 tablet 1      Sig: Take 2 tablets (1,500 mg total) by mouth daily with breakfast.      BuPROPion HCl ER, XL, 300 MG Ora closely associated with cardiovascular risk, increases significantly in American women after they reach age 36; the prevalence reaches 65% between 36 and 61 years, and 73.8% in women over age 61.     According to the Healthy Women Study, the average weight stages of menopause may also impair the function of leptin and neuropeptide Y, hormones that control fullness and appetite. Therefore, women in the late stages of perimenopause who have low estrogen levels may be driven to eat more calories and store fat. moderate exercise per week. Add ANY activity to your day. Strength and resistance training help maintain bone mass. This will help to prevent osteoporosis, which is bone loss that can lead to easy fractures. Rest and relax.  Try to relax before bed and ge

## 2024-07-07 ENCOUNTER — PATIENT MESSAGE (OUTPATIENT)
Dept: HEMATOLOGY/ONCOLOGY | Age: 59
End: 2024-07-07

## 2024-07-07 DIAGNOSIS — Z85.3 HISTORY OF BREAST CANCER: ICD-10-CM

## 2024-07-07 DIAGNOSIS — Z12.31 ENCOUNTER FOR SCREENING MAMMOGRAM FOR MALIGNANT NEOPLASM OF BREAST: Primary | ICD-10-CM

## 2024-07-08 NOTE — TELEPHONE ENCOUNTER
From: Sangeeta Aragon  To: Khris Wall  Sent: 7/7/2024 12:41 AM CDT  Subject: Mammogram     Hi Dr. Wall. Hope this finds you well. Could you put in an order for a mammogram for me? I got a note telling me that I need to schedule one.    Thanks,    Sangeeta Aragon

## 2024-08-06 ENCOUNTER — HOSPITAL ENCOUNTER (OUTPATIENT)
Dept: MAMMOGRAPHY | Age: 59
Discharge: HOME | End: 2024-08-06
Attending: INTERNAL MEDICINE
Payer: COMMERCIAL

## 2024-08-06 DIAGNOSIS — Z85.3 HISTORY OF BREAST CANCER: ICD-10-CM

## 2024-08-06 DIAGNOSIS — Z12.31 ENCOUNTER FOR SCREENING MAMMOGRAM FOR MALIGNANT NEOPLASM OF BREAST: ICD-10-CM

## 2024-08-06 PROCEDURE — 77067 SCR MAMMO BI INCL CAD: CPT | Performed by: INTERNAL MEDICINE

## 2024-08-06 PROCEDURE — 77063 BREAST TOMOSYNTHESIS BI: CPT | Performed by: INTERNAL MEDICINE

## 2024-08-14 ENCOUNTER — LAB REQUISITION (OUTPATIENT)
Dept: LAB | Facility: HOSPITAL | Age: 59
End: 2024-08-14
Payer: COMMERCIAL

## 2024-08-14 DIAGNOSIS — M19.041 PRIMARY OSTEOARTHRITIS, RIGHT HAND: ICD-10-CM

## 2024-08-14 DIAGNOSIS — M67.441 GANGLION, RIGHT HAND: ICD-10-CM

## 2024-08-14 PROCEDURE — 88304 TISSUE EXAM BY PATHOLOGIST: CPT | Performed by: ORTHOPAEDIC SURGERY

## 2024-09-04 ENCOUNTER — OFFICE VISIT (OUTPATIENT)
Dept: HEMATOLOGY/ONCOLOGY | Age: 59
End: 2024-09-04
Attending: INTERNAL MEDICINE
Payer: COMMERCIAL

## 2024-09-04 VITALS
HEART RATE: 83 BPM | HEIGHT: 67 IN | SYSTOLIC BLOOD PRESSURE: 129 MMHG | TEMPERATURE: 98 F | DIASTOLIC BLOOD PRESSURE: 84 MMHG | BODY MASS INDEX: 38.61 KG/M2 | OXYGEN SATURATION: 96 % | RESPIRATION RATE: 20 BRPM | WEIGHT: 246 LBS

## 2024-09-04 DIAGNOSIS — D47.2 MONOCLONAL GAMMOPATHY: Primary | ICD-10-CM

## 2024-09-04 DIAGNOSIS — Z85.3 HISTORY OF BREAST CANCER: ICD-10-CM

## 2024-09-04 PROCEDURE — 99214 OFFICE O/P EST MOD 30 MIN: CPT | Performed by: INTERNAL MEDICINE

## 2024-09-04 NOTE — PROGRESS NOTES
Cancer Center Progress Note  Patient Name: Sangeeta Aragon   YOB: 1965   Medical Record Number: [id number]     Attending Physician: Khris Wall M.D.     Date of Visit: 9/4/2024        Chief Complaint:  Chief Complaint   Patient presents with    Cancer     Follow up with         Oncologic History:  Sangeeta Aragon is a 59 year old female with a PMH of thyroid cancer referred for a second opinion for a diagnosis of L breast cancer.  Pt reports that she was in her usual state of health until her routine mammogram 0n 7/14/09 on which was noted 2 areas of concern.  Stereotactic biopsy confirmed the presence of an area of DCIS with a small area on invasive carcinoma and a second lesion c/w moderately differentiated invasiveadenocarcinoma.  The receptor status was ER+, NE-, HER2 2+.  FISH for HER 2 was not amplified.  She underwent a CT C/A/P that was negative for metastatic disease.  As her mother also had breast cancer, BRCA testing was performed and was negative.   She underwent L mastectomy. Her final path revealed a 1.0x0.7x0.5 cm Grade 3 infiltrating ductal carcinoma as well as infiltrating ductal ca of the nipple and two separate areasf infiltrating ductal ca 0.1 and 0.2 cm in size.  5 LN negative.  Margins were clear. T1b N0 M0, Grade 3, ER+, NE -, Her2 -.  Oncotype 44.  The high oncotype score increased her risk of recurrence in the next 10 years to 30% with hormone therapy alone.  We discussed that the risk could be reduced to 12% with chemotherapy followed by Tamoxifen.  The decision to use an anthracycline was a difficult one.  Unfortunately she did not qualify for NSABP B-46 because her tumor was 1.0cm (not >1.0cm).  We discussed the risks and benefits of AC->T vs TC and the lack of long term evidence that TC is as good as AC->T in aggressive breast cancers.  We agreed upon a dose-dense approach to AC->T with growth factor support.  She tolerated AC well, but have  profound arthralgia and grade 3 neuropathy with the first dose of Taxol.  The neuropathy has recovered somewhat, but pt was unable to continue on with Taxol and was transitioned to Tamoxifen with a plan for 5 years of hormonal therapy.     She discontinued hormonal therapy after 5 years due to intolerable side effects.    History of Present Illness:  Pt is here for evaluation of a new problem. She has been following with rheumatology for an undifferentiated connective tissue disease. She has had monoclonal protein studies done in the past that were negative. The most recent, however, revealed a tiny IgGk M-spike 0.2 g/dL. She has no symptoms save her regular arthritis sx. No F/C/NS.   She has normal blood counts and normal renal function.     She now has her breast exams done by gynecology.      Performance Status:  ECOG 0    Past Medical History:  Past Medical History:    Abdominal pain, unspecified site    Acute sinusitis, unspecified    Anxiety    Breast CA (HCC)    DCIS    Cancer (HCC)    Breast 2009    Cancer (HCC)    Thyroid 2003    Depression    Diarrhea    x3    Ductal carcinoma in situ of breast    Egg allergy    Elevated cholesterol    Hypothyroidism    Insulin resistance    Milk allergy    Nausea with vomiting    Otalgia, unspecified    Rash and other nonspecific skin eruption    S/P thyroidectomy       Past Surgical History:  Past Surgical History:   Procedure Laterality Date    Chemotherapy Left 2009    left mastectomy with chemo.    Colonoscopy  08/13/2011    D & c      Hysterectomy  11/16/2006    [facility]    Mastectomy left Left 2009    DCIS    Needle biopsy right Right 03/2011    US guided biopsy - benign    Other      total thyroidectomy    Reduction right Right 2010    Thyroidectomy      goiter and thyroid cancer    Tonsillectomy         Family History:  Family History   Problem Relation Age of Onset    Breast Cancer Self 42    DCIS Self     Breast Cancer Mother 48        AND 81    Cancer Mother      Heart Disorder Mother         CAD, stent    Obesity Mother     Other (type 2 diabetes) Maternal Grandfather     Other (prostate cancer) Paternal Grandfather     Breast Cancer Paternal Cousin Female 40    Hypertension Other         family hx       Social History:  Social History     Socioeconomic History    Marital status:      Spouse name: Not on file    Number of children: Not on file    Years of education: Not on file    Highest education level: Not on file   Occupational History    Not on file   Tobacco Use    Smoking status: Never    Smokeless tobacco: Never   Vaping Use    Vaping status: Never Used   Substance and Sexual Activity    Alcohol use: Yes     Alcohol/week: 0.0 standard drinks of alcohol     Comment: rare    Drug use: No    Sexual activity: Not on file   Other Topics Concern     Service Not Asked    Blood Transfusions Not Asked    Caffeine Concern Yes     Comment: 1-2 daily    Occupational Exposure Not Asked    Hobby Hazards Not Asked    Sleep Concern Not Asked    Stress Concern Not Asked    Weight Concern Not Asked    Special Diet Not Asked    Back Care Not Asked    Exercise Yes     Comment: at least 4-5x per week    Bike Helmet Not Asked    Seat Belt Yes    Self-Exams Not Asked   Social History Narrative    Not on file     Social Determinants of Health     Financial Resource Strain: Not on file   Food Insecurity: Not on file   Transportation Needs: Not on file   Physical Activity: Not on file   Stress: Not on file   Social Connections: Unknown (3/14/2021)    [facility]    Social Connections     Conversations with friends/family/neighbors per week: Not on file   Housing Stability: Low Risk  (7/7/2021)    [facility]    Housing Stability     Mortgage Payment Concerns?: Not on file     Number of Places Lived in the Last Year: Not on file     Unstable  Housing?: Not on file         Current Medications:    Current Outpatient Medications:     SYNTHROID 175 MCG Oral Tab, TAKE 1 TABLET BY MOUTH EVERY OTHER MORNING BEFORE BREAKFAST ALTERNATING WITH 150 MCG, Disp: 45 tablet, Rfl: 5    Multiple Vitamins-Minerals (MULTIVITAMIN WOMEN) Oral Tab, Take 1 tablet by mouth daily., Disp: , Rfl:     Cholecalciferol (VITAMIN D3) 18507 units Oral Cap, Take by mouth., Disp: , Rfl:     Cyanocobalamin (VITAMIN B-12 OR), Take 1 tablet by mouth., Disp: , Rfl:     BusPIRone HCl 30 MG Oral Tab, Take 1 tablet (30 mg total) by mouth 2 (two) times daily., Disp: , Rfl:     Escitalopram Oxalate 20 MG Oral Tab, TK 1 T PO QD, Disp: , Rfl: 0    Probiotic Product (PROBIOTIC DAILY OR), Take  by mouth daily., Disp: , Rfl:     Allergies:  Allergies   Allergen Reactions    Cephalosporins DIARRHEA, NAUSEA AND VOMITING and ANAPHYLAXIS     Hands and feet become swollen    Ciprofloxacin DIARRHEA, NAUSEA ONLY and RASH     HCI TABS    Bactrim HIVES     DS TABS    Clarithromycin     Clindamycin NAUSEA AND VOMITING    Dander ITCHING and SWELLING     Hand, feet, eyes    Fluocinolone DIARRHEA    Sulfamethoxazole W/Trimethoprim RASH        Review of Systems:    Constitutional No fevers, chills, night sweats, excessive fatigue or weight loss.   Eyes No significant visual difficulties. No diplopia. No yellowing.   Hematologic/Lymphatic Normal - No easy bruising or bleeding.  No tender or palpable lymph nodes.   Respiratory No dyspnea, pleuritic chest pain, cough or hemoptysis.   Cardiovascular No anginal chest pain, palpitations or orthopnea.   Gastrointestinal No nausea, vomiting, diarrhea, GI bleeding, or constipation. NL appetite.   Genitorurinary  No hematuria, dysuria, abnormal bleeding, or incontinence.   Integumentary No rashes or yellowing of the skin   Neurologic No headache, blurred vision, and no areas of focal weakness. Normal gait.   Psychiatric No insomnia, depression, snow or mood swings.          Vital Signs:  /84 (BP Location: Right arm, Patient Position: Sitting, Cuff Size: large)   Pulse 83   Temp 97.7 °F (36.5 °C) (Tympanic)   Resp 20   Ht 1.702 m (5' 7\")   Wt 111.6 kg (246 lb)   SpO2 96%   BMI 38.53 kg/m²     Physical Examination:      Laboratory:  omponent  Ref Range & Units 3 wk ago Comments   PROTEIN  6.2 - 8.0 GM/DL 6.3    ALBUMIN  3.6 - 5.0 GM/DL 3.7    ALPHA 1  0.2 - 0.3 GM/DL 0.3    ALPHA 2  0.5 - 1.0 GM/DL 0.7    BETA  0.6 - 1.2 GM/DL 0.8    GAMMA  0.7 - 1.5 GM/DL 0.8    INTERPRETATION SPE INTERPRETATION COMMENT THE SERUM PROTEIN ELECTROPHORESIS PATTERN SHOWS  THE PRESENCE OF A TINY PEAK IN THE MID GAMMA REGION   THAT IS PRESENT AT A CONCENTRATION OF APPROXIMATELY 0.2 GM PER DL.  INTERPRET IN CONJUNCTION WITH THE IMMUNOTYPING STUDY THAT WAS ORDERED  CONCURRENTLY ON THIS SAMPLE.  REVIEWED BY DR VICKEY VERONICA JR, MD     omponent 3 wk ago Comments   IMMUNOFIXATION SERUM IMMUNOFIXATION COMMENT HIGH RESOLUTION CAPILLARY ELECTROPHORESIS OF THE SERUM SHOWS  THE PRESENCE OF A TINY PEAK IN THE MID GAMMA REGION.  THE PEAK IS IDENTIFIED BY IMMUNOTYPING AS AN IGG KAPPA MONOCLONAL PROTEIN.     Component  Ref Range & Units 3 wk ago   IG G  635 - 1741 MG/   IG A  66 - 433 MG/   IG M  45 - 281 MG/DL 56     Component  Ref Range & Units 3 wk ago   WBC  3.5 - 10.5 K/UL 7.1   RBC  3.80 - 5.20 M/UL 4.19   HGB  11.5 - 15.5 GM/DL 12.6   HCT  34.0 - 46.5 % 38.5   MCV  82.0 - 99.0 FL 91.9   MCH  27.0 - 34.0 PG 30.1   MCHC  32.0 - 36.0 GM/DL 32.7   RDW  11.0 - 15.0 % 13.2   PLT CNT  150 - 400 K/   MPV  9.0 - 13.0 FL 9.6   DIFF TYPE AUTOMATED   GRAN % 65   GRAN #  1.5 - 7.0 K/MM3 4.7   LYMPH % 21   LYMPH #  1.0 - 4.0 K/MM3 1.5   MONO % 11   MONO #  0.0 - 1.0 K/MM3 0.8   EO % 2   EO #  0.0 - 0.7 K/MM3 0.1   BASO % 1   BASO #  0.0 - 0.2 K/MM3 0.0   IMMATURE GRANULOCYTE %  % <1   ABS IMM. GRANULOCYTE  0.00 - 0.15 K/UL 0.02   AUTOMATED NRBC  <1 /100 WBC 0.0     Units 3 wk ago Comments    CREATININE  0.6 - 1.4 MG/DL 0.66      Radiology:  Mammogram CONCLUSION:       BI-RADS CATEGORY:    DIAGNOSTIC CATEGORY 1--NEGATIVE NO CHANGE FROM COMPARISON ASSESSMENT.       RECOMMENDATIONS:    ROUTINE MAMMOGRAM AND CLINICAL EVALUATION IN 12 MONTHS.   .      Pathology:    Impression and Plan:  Monoclonal Gammopathy: The patient has a tiny IgGk M-spike on a recent SPEP. She has normal CBC and Normal renal function. Will check a repeat SPEP today to confirm the finding. If it resolves, no further workup is indicated.   If it is persistent, will check a LD CT of the body to eval for lytic lesions and a 24 hr urine for Bence Stewart proteins.    History of L Breast Cancer:  1.0x0.7x0.5 cm Grade 3 infiltrating ductal carcinoma as well as infiltrating ductal ca of the nipple and two separate areasf infiltrating ductal ca 0.1 and 0.2 cm in size.  5 LN negative.  Margins were clear. T1b N0 M0, Grade 3, ER+ (75%), OR -, Her2 -.  Oncotype 44.  The patient tolerated 4 cycles of adjuvant AC, but was unable to tolerate Taxol.  She has completed 5 years of adjuvant Tamoxifen. We reviewed the most recent data recommending 10 years of adjuvant endocrine therapy. The risk of recurrence is decreased by ~6% with the extension of therapy.  The patient did not believe that she could tolerate continuing Tamoxifen.  We also discussed the possibility of re-evaluating her menopause status and, if she is post-menopausal, transitioning to an aromatase inhibitor to complete therapy.  We reviewed the side effects of AIs, including the potential for similar night sweats, hot flashes and emotional lability.  The patient declined that therapy as well.  She has discontinued the Tamoxifen with resolution of her side effects.  Will continue annual mammography. She gets her breast exams with her gynecologist.     Planned Follow Up:  By Phone/[facility] when results are available.     I spent 30 min with the patient, 20 min of which was spent on  counseling.     Electronically Signed by:    Khris Wall M.D.  [facility]

## 2024-09-04 NOTE — PROGRESS NOTES
Patient is here today for annual follow up with   for Breast Cancer and Abnormal lab result. Was told by Rheumatology to see hematology. Labs were drawn at [facility].  Stated seeing Gyne in the next week or two for Breast exam and PAP. Patient denies pain. Medication list and medical history were reviewed and updated.     Education Record    Learner:  Patient and spouse    Disease / Diagnosis:  Breast Cancer and Abnormal lab result.    Barriers / Limitations:  None   Comments:    Method:  Brief focused, Discussion, Printed material and Reinforcement   Comments:    General Topics:  Medication, Pain, Procedure and Plan of care reviewed   Comments:    Outcome:  Shows understanding   Comments:    AVS provided and follow up reviewed.  Patient instructed to call as needed.

## 2024-09-09 LAB
ALBUMIN SERPL ELPH-MCNC: 3.92 G/DL (ref 3.75–5.21)
ALBUMIN/GLOB SERPL: 1.36 {RATIO} (ref 1–2)
ALPHA1 GLOB SERPL ELPH-MCNC: 0.33 G/DL (ref 0.19–0.46)
ALPHA2 GLOB SERPL ELPH-MCNC: 0.8 G/DL (ref 0.48–1.05)
B-GLOBULIN SERPL ELPH-MCNC: 0.86 G/DL (ref 0.68–1.23)
GAMMA GLOB SERPL ELPH-MCNC: 0.9 G/DL (ref 0.62–1.7)
KAPPA LC FREE SER-MCNC: 1.67 MG/DL (ref 0.33–1.94)
KAPPA LC FREE/LAMBDA FREE SER NEPH: 1.23 {RATIO} (ref 0.26–1.65)
LAMBDA LC FREE SERPL-MCNC: 1.36 MG/DL (ref 0.57–2.63)
PROT SERPL-MCNC: 6.8 G/DL (ref 5.7–8.2)

## 2025-03-03 ENCOUNTER — TELEPHONE (OUTPATIENT)
Dept: DERMATOLOGY | Age: 60
End: 2025-03-03

## 2025-04-29 ENCOUNTER — APPOINTMENT (OUTPATIENT)
Dept: DERMATOLOGY | Age: 60
End: 2025-04-29

## 2025-04-29 DIAGNOSIS — L91.8 SKIN TAG: Primary | ICD-10-CM

## 2025-04-29 PROCEDURE — 11200 RMVL SKIN TAGS UP TO&INC 15: CPT | Performed by: PHYSICIAN ASSISTANT

## 2025-06-09 ENCOUNTER — TELEPHONE (OUTPATIENT)
Dept: DERMATOLOGY | Age: 60
End: 2025-06-09

## 2025-06-10 ENCOUNTER — APPOINTMENT (OUTPATIENT)
Dept: DERMATOLOGY | Age: 60
End: 2025-06-10

## 2025-06-10 DIAGNOSIS — Z12.83 SCREENING EXAM FOR SKIN CANCER: ICD-10-CM

## 2025-06-10 DIAGNOSIS — L81.4 LENTIGINES: ICD-10-CM

## 2025-06-10 DIAGNOSIS — D48.9 NEOPLASM OF UNCERTAIN BEHAVIOR: Primary | ICD-10-CM

## 2025-06-10 DIAGNOSIS — L82.1 MACULAR SEBORRHEIC KERATOSIS: ICD-10-CM

## 2025-06-10 DIAGNOSIS — D23.9 DERMATOFIBROMA: ICD-10-CM

## 2025-06-10 PROCEDURE — 11310 SHAVE SKIN LESION 0.5 CM/<: CPT | Performed by: PHYSICIAN ASSISTANT

## 2025-06-10 PROCEDURE — 99214 OFFICE O/P EST MOD 30 MIN: CPT | Performed by: PHYSICIAN ASSISTANT

## 2025-06-10 RX ORDER — LEVOTHYROXINE SODIUM 175 UG/1
175 TABLET ORAL EVERY OTHER DAY
COMMUNITY

## 2025-06-10 RX ORDER — LEVOTHYROXINE SODIUM 175 UG/1
175 TABLET ORAL DAILY
COMMUNITY
Start: 2024-11-05 | End: 2025-10-31

## 2025-06-10 RX ORDER — BUPROPION HYDROCHLORIDE 300 MG/1
300 TABLET ORAL DAILY
COMMUNITY

## 2025-06-10 RX ORDER — LORAZEPAM 0.5 MG/1
0.5 TABLET ORAL DAILY
COMMUNITY
Start: 2025-05-25

## 2025-06-10 RX ORDER — NICOTINE 14MG/24HR
1 PATCH, TRANSDERMAL 24 HOURS TRANSDERMAL DAILY
COMMUNITY

## 2025-06-10 RX ORDER — ZONISAMIDE 50 MG/1
1 CAPSULE ORAL DAILY
COMMUNITY

## 2025-06-10 RX ORDER — HYDROXYCHLOROQUINE SULFATE 200 MG/1
400 TABLET, FILM COATED ORAL DAILY
COMMUNITY
Start: 2025-06-04

## 2025-06-10 RX ORDER — ESCITALOPRAM OXALATE 5 MG/1
5 TABLET ORAL DAILY
COMMUNITY
Start: 2025-06-04

## 2025-06-11 ENCOUNTER — TELEPHONE (OUTPATIENT)
Dept: DERMATOLOGY | Age: 60
End: 2025-06-11

## 2025-06-11 RX ORDER — HYDROQUINONE 40 MG/G
CREAM TOPICAL
Qty: 28.35 G | Refills: 3 | Status: SHIPPED | OUTPATIENT
Start: 2025-06-11

## 2025-06-17 ENCOUNTER — RESULTS FOLLOW-UP (OUTPATIENT)
Dept: DERMATOLOGY | Age: 60
End: 2025-06-17

## 2025-06-17 LAB
ASR DISCLAIMER: NORMAL
CASE RPRT: NORMAL
CLINICAL INFO: NORMAL
PATH REPORT.FINAL DX SPEC: NORMAL
PATH REPORT.GROSS SPEC: NORMAL

## (undated) NOTE — MR AVS SNAPSHOT
After Visit Summary   2/26/2017    Pamela Amor    MRN: [id number]           Visit Information        Provider Department Dept Phone    2/26/2017  7:00 PM Bed1  [facility] [phone]      Allergies as of 2/26/2017  Reviewed on: 2/8/2017 3/8/2017 5:00 PM Tari Ramos [facility]    4/7/2017 1:00 PM Rolando Diaz [facility]    4/12/2017 5:00 PM Tari Ramos [facility]    5/10/2017 5:00 PM Cookie Wilkins [facility]                [facility] n

## (undated) NOTE — MR AVS SNAPSHOT
[address] [phone]               Thank you for choosing us for your health care visit with Luisa Herrera RD.   We are glad to serve you and happy to provide you with this summary of Fish-Derived Products Swelling    Fingers/toes                Today's Vital Signs     Weight                   231 lb              Current Medications          This list is accurate as of: 4/7/17  3:33 PM.  Always use your most recent med list. Summaries. If you've been to the Emergency Department or your doctor's office, you can view your past visit information in [employer] by going to Visits < Visit Summaries. [employer] questions? Call [phone] for help.   [employer] is NOT to be used for urge

## (undated) NOTE — MR AVS SNAPSHOT
[facility] [address]  [address] [facility]  [phone]               Thank you for choosing us for your health care visit with Teresita Gardiner MD.  We are glad to serve you and happy to provide you with aware of all of the risks of treatment even beyond those discussed today.  All therapies have potential risk of harm or side effects or medication interactions.  It is your duty and for your safety to discuss with the pharmacist and our office with landen Cephalosporins Diarrhea, Nausea and vomiting    Ciprofloxacin Diarrhea, Nausea only    HCI TABS    Clindamycin Nausea and vomiting    Dander Itching, Swelling    Hand, feet, eyes    Fish-Derived Products Swelling    Fingers/toes                Today's Vit You can access your MyChart to more actively manage your health care and view more details from this visit by going to [email or web address].   If you've recently had a stay at the Hospital you can access your discharge instructions in [address] by sy[place]

## (undated) NOTE — Clinical Note
Patient was seen for their 4 month f/u at [facility] with a total weight loss of 9# since initial consult. Inconsistent commitment to lifestyle changes.

## (undated) NOTE — Clinical Note
Patient was seen for their 2 month f/u at [facility] with a total weight loss of 12# since initial consult.

## (undated) NOTE — ED AVS SNAPSHOT
Pamela Amor   MRN: [id number]    Department:  [facility] Emergency Department in [place]   Date of Visit:  6/14/2018           Disclosure     Insurance plans vary and the physician(s) referred by the ER may not be covered by your plan.  Please con tell this physician (or your personal doctor if your instructions are to return to your personal doctor) about any new or lasting problems. The primary care or specialist physician will see patients referred from the [facility] Emergency Department.  Car Nash

## (undated) NOTE — MR AVS SNAPSHOT
[address], [address] [place] [address] [phone]               Thank you for choosing us for your health care visit with JANES Quintero.   We are glad to serve you and happy to provide you with this summar Instructions and Information about Your Health    Continue making lifestyle changes that focus on good nutrition, regular exercise and stress management. Medication Plan: Continue diethylpropion and metformin XR at current dose.  Add Wellbutrin  mg But what can you do to learn how to stop emotional overeating? Most of us learn emotional eating at a very young age.  We get into the habit of using food to sooth stressful feelings, alleviate boredom, reward and comfort ourselves, boost our sprits and ce 10. Use healthy distraction. Instead of overeating, take a walk, surf the Internet, pet your cat or dog, listen to music, enjoy a warm bath, read a good book, watch a movie, work in the garden or talk to a friend. 11. Practice mindfulness.  Mindful eating Try not to put off eating for too long. Waiting until level 1 or 2 — when you are starving and unable to concentrate — may lead to overeating. When you first start to feel any of the symptoms listed above, you should probably start to think about eating. BP Pulse Height Weight BMI    124/84 mmHg 82 68\" 233 lb 35.44 kg/m2         Current Medications          This list is accurate as of: 5/10/17  5:52 PM.  Always use your most recent med list.                ALPRAZolam 0.25 MG Tabs   TK 1 T PO D PRN   Comm You can get these medications from any pharmacy     Bring a paper prescription for each of these medications    - Diethylpropion HCl ER 75 MG Tb24            MyChart     Visit MyChart  You can access your MyChart to more actively manage your health care an

## (undated) NOTE — Clinical Note
Patient was seen for their f/u at [facility] with a total weight loss of 11# since initial consult. Limited success, uncertain of patient motivation. Often supplying many excuses for lack of adherence to exercise and nutrition plan.

## (undated) NOTE — Clinical Note
Patient was seen for their 3 month f/u at [facility] with a total weight loss of 12# since initial consult.

## (undated) NOTE — Clinical Note
Thank you for referring Sangeeta to the [facility]. I met with her in consultation today. I have ordered labs, set up a nutrition consultation with our dietician, completed an EKG, ordered a sleep study and stress echo.   She was started on

## (undated) NOTE — MR AVS SNAPSHOT
[address], [address] [place] [address] [phone]               Thank you for choosing us for your health care visit with JANES Duncan.   We are glad to serve you and happy to provide you with this summar DMII (diabetes mellitus, type 2)    Severe obesity (BMI 35.0-39. 9)    Therapeutic drug monitoring    Other migraine without status migrainosus, not intractable          Instructions and Information about Your Health    Congratulations on your 6# weight lo **The Hunger Rating Scale can help you decide if you are experiencing real hunger. Remember that physical hunger builds gradually over time (usually over several hours after a meal), whereas emotional eating and cravings usually come on very suddenly.  Whe 4 = Beginning signs and symptoms of hunger    3 = Hungry with several hunger symptoms, ready to eat    2 = Very hungry, unable to concentrate    Hungry - 1    1 = Starving, dizzy, irritable     ______________________________________________________________ Commonly known as:  GLUCOPHAGE-XR           PROBIOTIC DAILY OR   Take  by mouth daily.            SYNTHROID 150 MCG Tabs   Generic drug:  Levothyroxine Sodium   TAKE 1 TABLET BY MOUTH BEFORE BREAKFAST           topiramate 25 MG Tabs   Take 1 tablet (25 mg t

## (undated) NOTE — MR AVS SNAPSHOT
[address] [phone]               Thank you for choosing us for your health care visit with JANES Barahona.   We are glad to serve you and happy to provide you with this summar examples include: greek yogurt, cottage cheese, hard boiled egg, whole grain toast with peanut butter.   3.  Reduce carbohydrates which includes sugar items such as sweets as well as rice, pasta, potatoes and bread and make sure to choose whole grain option Take 1 Tab by mouth daily. Blood Gluc Meter Disp-Strips Shannan   Patient to test daily and PRN for uncontrolled blood sugars. [id number]           BusPIRone HCl 10 MG Tabs   Take 10 mg by mouth. Take 2 tablets (20mg) twice daily. Total 40mg daily. Vitamin D, 25-Hydroxy [E]    Complete by: Feb 08, 2017 (Approximate)    Assoc Dx: Severe obesity (BMI 35.0-39.9) (HCC) [E66.01], Vitamin D deficiency [E55.9]           MTHFR [E]    Complete by: Feb 08, 2017 (Approximate)    Assoc Dx:   Severe obesity (B Cardiology:  CARD ECHO STRESS ECHO/REST AND STRESS (CPT=93351)    Instructions:  **THIS IS NOT A REFERRAL**    This service can be performed at the following locations. Please call one of the numbers listed below to schedule an appointment.      Tyrone Saravia your appointment immediately. However, if you are unsure about the requirements for authorization, please wait 5-7 days and then contact your physician's   office.  At that time, you will be provided with any authorization numbers or be assured that none ar

## (undated) NOTE — MR AVS SNAPSHOT
[address] [phone]               Thank you for choosing us for your health care visit with JANES Carpenter.   We are glad to serve you and happy to provide you with this summar Other migraine without status migrainosus, not intractable          Instructions and Information about Your Health    Congratulations on your 6# weight loss!  Continue making lifestyle changes that focus on good nutrition, regular exercise and stress manag What changed:    - how much to take  - when to take this   Commonly known as:  GLUCOPHAGE-XR           PROBIOTIC DAILY OR   Take  by mouth daily.            SYNTHROID 150 MCG Tabs   Generic drug:  Levothyroxine Sodium   TAKE 1 TABLET BY MOUTH BEFORE BREAKFA

## (undated) NOTE — MR AVS SNAPSHOT
[facility] [address]  [address] [facility] [place] [facility]  [phone]               Thank you for choosing us for your health care visit with Jen Lopez MD.  We are glad to serve you and happy to provide you with call [facility] Scheduling at [phone].          Reason for Today's Visit     Mass           Medical Issues Discussed Today     Parotid swelling    -  Primary      Instructions and Information about Your Health     None      Allergies as of Commonly known as:  TOPAMAX           TraZODone HCl 50 MG Tabs   TK 1 T PO ONCE  D   Commonly known as:  DESYREL           VITAMIN B-12 CR OR   Take  by mouth. Vitamin D3 5000 units Caps   Take 5,000 Units by mouth daily.                    [employer]

## (undated) NOTE — Clinical Note
Patient was seen for their 1 month f/u at [facility] with a total weight loss of 6# since initial consult.

## (undated) NOTE — Clinical Note
Patient was seen for their f/u at [facility] with a total weight loss of 13# since initial consult.